# Patient Record
Sex: MALE | Race: WHITE | NOT HISPANIC OR LATINO | Employment: OTHER | URBAN - METROPOLITAN AREA
[De-identification: names, ages, dates, MRNs, and addresses within clinical notes are randomized per-mention and may not be internally consistent; named-entity substitution may affect disease eponyms.]

---

## 2017-05-10 PROBLEM — K76.89 OTHER CHRONIC NONALCOHOLIC LIVER DISEASE: Status: ACTIVE | Noted: 2017-05-10

## 2017-08-01 PROBLEM — K76.0 FATTY LIVER: Status: ACTIVE | Noted: 2017-08-01

## 2017-11-22 PROBLEM — I34.0 NON-RHEUMATIC MITRAL REGURGITATION: Status: ACTIVE | Noted: 2017-11-22

## 2017-11-22 PROBLEM — I25.10 CORONARY ARTERY DISEASE: Status: ACTIVE | Noted: 2017-11-22

## 2018-01-19 PROBLEM — K75.81 NASH (NONALCOHOLIC STEATOHEPATITIS): Status: ACTIVE | Noted: 2017-05-10

## 2018-08-16 PROBLEM — Z13.6 SCREENING FOR AAA (ABDOMINAL AORTIC ANEURYSM): Status: ACTIVE | Noted: 2018-08-16

## 2018-08-18 PROBLEM — M85.89 OSTEOPENIA OF MULTIPLE SITES: Chronic | Status: ACTIVE | Noted: 2018-08-18

## 2018-11-19 PROBLEM — Z13.6 SCREENING FOR AAA (ABDOMINAL AORTIC ANEURYSM): Status: RESOLVED | Noted: 2018-08-16 | Resolved: 2018-11-19

## 2019-02-21 PROBLEM — K44.9 HIATAL HERNIA: Status: ACTIVE | Noted: 2019-02-21

## 2019-02-21 PROBLEM — I65.29 OCCLUSION AND STENOSIS OF CAROTID ARTERY WITHOUT MENTION OF CEREBRAL INFARCTION: Status: ACTIVE | Noted: 2019-02-21

## 2019-02-21 PROBLEM — K43.9 VENTRAL HERNIA, UNSPECIFIED, WITHOUT MENTION OF OBSTRUCTION OR GANGRENE: Status: ACTIVE | Noted: 2019-02-21

## 2019-02-21 PROBLEM — M81.0 AGE-RELATED OSTEOPOROSIS WITHOUT CURRENT PATHOLOGICAL FRACTURE: Status: ACTIVE | Noted: 2019-02-21

## 2019-02-21 PROBLEM — M15.0 PRIMARY OSTEOARTHRITIS INVOLVING MULTIPLE JOINTS: Status: ACTIVE | Noted: 2019-02-21

## 2019-02-21 PROBLEM — M19.049 OSTEOARTHROSIS, UNSPECIFIED WHETHER GENERALIZED OR LOCALIZED, HAND: Status: ACTIVE | Noted: 2019-02-21

## 2019-02-21 PROBLEM — N40.0 HYPERTROPHY OF PROSTATE WITHOUT URINARY OBSTRUCTION AND OTHER LOWER URINARY TRACT SYMPTOMS (LUTS): Status: ACTIVE | Noted: 2019-02-21

## 2019-02-21 PROBLEM — M15.9 PRIMARY OSTEOARTHRITIS INVOLVING MULTIPLE JOINTS: Status: ACTIVE | Noted: 2019-02-21

## 2019-02-21 PROBLEM — I65.29 STENOSIS OF CAROTID ARTERY: Status: ACTIVE | Noted: 2019-02-21

## 2019-02-21 PROBLEM — J30.1 SEASONAL ALLERGIC RHINITIS DUE TO POLLEN: Status: ACTIVE | Noted: 2019-02-21

## 2019-02-21 PROBLEM — I36.1 NON-RHEUMATIC TRICUSPID VALVE INSUFFICIENCY: Status: ACTIVE | Noted: 2019-02-21

## 2019-02-21 PROBLEM — I27.9 CHRONIC PULMONARY HEART DISEASE: Status: ACTIVE | Noted: 2019-02-21

## 2019-02-21 PROBLEM — M62.00 DIASTASIS OF MUSCLE: Status: ACTIVE | Noted: 2019-02-21

## 2019-05-08 PROBLEM — J98.11 ATELECTASIS OF BOTH LUNGS: Status: ACTIVE | Noted: 2019-05-08

## 2019-05-08 PROBLEM — M48.10 DISH (DIFFUSE IDIOPATHIC SKELETAL HYPEROSTOSIS): Chronic | Status: ACTIVE | Noted: 2019-05-08

## 2019-05-08 PROBLEM — R91.8 LUNG NODULES: Status: ACTIVE | Noted: 2019-05-08

## 2019-05-08 PROBLEM — M48.10 DISH (DIFFUSE IDIOPATHIC SKELETAL HYPEROSTOSIS): Status: ACTIVE | Noted: 2019-05-08

## 2019-05-08 PROBLEM — I70.0 THORACIC AORTA ATHEROSCLEROSIS: Chronic | Status: ACTIVE | Noted: 2019-05-08

## 2019-12-02 PROBLEM — R06.89 ABNORMAL BREATH SOUNDS: Status: ACTIVE | Noted: 2019-12-02

## 2019-12-27 PROBLEM — R00.1 BRADYCARDIA: Status: ACTIVE | Noted: 2019-12-27

## 2019-12-27 PROBLEM — E86.1 HYPOTENSION DUE TO HYPOVOLEMIA: Status: ACTIVE | Noted: 2019-12-27

## 2019-12-27 PROBLEM — R06.2 WHEEZE: Status: ACTIVE | Noted: 2019-12-27

## 2019-12-27 PROBLEM — N17.9 AKI (ACUTE KIDNEY INJURY): Status: ACTIVE | Noted: 2019-12-27

## 2019-12-27 PROBLEM — E87.5 HYPERKALEMIA: Status: ACTIVE | Noted: 2019-12-27

## 2019-12-27 PROBLEM — R55 SYNCOPE: Status: ACTIVE | Noted: 2019-12-27

## 2019-12-27 PROBLEM — D69.6 THROMBOCYTOPENIA: Status: ACTIVE | Noted: 2019-12-27

## 2020-01-20 PROBLEM — R31.9 HEMATURIA: Status: ACTIVE | Noted: 2020-01-20

## 2020-06-22 PROBLEM — I10 ESSENTIAL HYPERTENSION: Chronic | Status: ACTIVE | Noted: 2020-06-22

## 2020-09-14 PROBLEM — E78.1 HYPERTRIGLYCERIDEMIA: Status: ACTIVE | Noted: 2020-09-14

## 2020-12-18 ENCOUNTER — OFFICE VISIT (OUTPATIENT)
Dept: URGENT CARE | Facility: CLINIC | Age: 75
End: 2020-12-18
Payer: MEDICARE

## 2020-12-18 VITALS
TEMPERATURE: 98 F | OXYGEN SATURATION: 95 % | HEART RATE: 68 BPM | DIASTOLIC BLOOD PRESSURE: 78 MMHG | HEIGHT: 67 IN | BODY MASS INDEX: 30.61 KG/M2 | SYSTOLIC BLOOD PRESSURE: 126 MMHG | WEIGHT: 195 LBS | RESPIRATION RATE: 16 BRPM

## 2020-12-18 DIAGNOSIS — R53.83 FATIGUE, UNSPECIFIED TYPE: Primary | ICD-10-CM

## 2020-12-18 LAB
CTP QC/QA: YES
SARS-COV-2 RDRP RESP QL NAA+PROBE: NEGATIVE

## 2020-12-18 PROCEDURE — 99214 OFFICE O/P EST MOD 30 MIN: CPT | Mod: S$GLB,CS,, | Performed by: FAMILY MEDICINE

## 2020-12-18 PROCEDURE — U0002: ICD-10-PCS | Mod: QW,S$GLB,, | Performed by: FAMILY MEDICINE

## 2020-12-18 PROCEDURE — 99214 PR OFFICE/OUTPT VISIT, EST, LEVL IV, 30-39 MIN: ICD-10-PCS | Mod: S$GLB,CS,, | Performed by: FAMILY MEDICINE

## 2020-12-18 PROCEDURE — U0002 COVID-19 LAB TEST NON-CDC: HCPCS | Mod: QW,S$GLB,, | Performed by: FAMILY MEDICINE

## 2020-12-18 NOTE — PROGRESS NOTES
"Subjective:       Patient ID: Rodney Irene Jr. is a 75 y.o. male.    Vitals:  height is 5' 7" (1.702 m) and weight is 88.5 kg (195 lb). His temperature is 97.8 °F (36.6 °C). His blood pressure is 126/78 and his pulse is 68. His respiration is 16 and oxygen saturation is 95%.     Chief Complaint: Sinus Problem    Patient c/o sinus issues, symptoms started 2-3 days ago. Symptoms: fatigue, shortness of breath, body aches, congestion, sinus headache.  Treatment tried: Zinc and Vitamin-C, no with relief    Sinus Problem  This is a new problem. The current episode started yesterday. The problem has been gradually worsening since onset. There has been no fever. Associated symptoms include congestion, coughing, headaches, shortness of breath and sinus pressure. Pertinent negatives include no chills, diaphoresis, ear pain or sore throat. Treatments tried: zinc and vitamin-c.       Constitution: Positive for appetite change, fatigue and generalized weakness. Negative for chills, sweating and fever.   HENT: Positive for congestion, sinus pain and sinus pressure. Negative for ear pain, sore throat and voice change.    Neck: Negative for painful lymph nodes.   Eyes: Negative for eye redness.   Respiratory: Positive for cough and shortness of breath. Negative for chest tightness, sputum production, bloody sputum, COPD, stridor, wheezing and asthma.    Gastrointestinal: Negative for nausea and vomiting.   Musculoskeletal: Negative for muscle ache.   Skin: Negative for rash.   Allergic/Immunologic: Negative for seasonal allergies and asthma.   Neurological: Positive for headaches.   Hematologic/Lymphatic: Negative for swollen lymph nodes.       Objective:      Physical Exam   Constitutional: Patient is oriented to person, place, and time. Patient appears well-developed. Patient is cooperative.  Non-toxic appearance. Patient does not appear ill. No distress.   HENT:   Head: Normocephalic and atraumatic.   Right Ear: Hearing " "and external ear normal.   Left Ear: Hearing and external ear normal.   Nose: Nose normal. No mucosal edema, rhinorrhea or nasal deformity. No epistaxis.   Mouth/Throat: Uvula is midline, oropharynx is clear and moist and mucous membranes are normal. No trismus in the jaw. Normal dentition. No uvula swelling. No posterior oropharyngeal erythema.   Eyes: Conjunctivae and lids are normal. Right eye exhibits no discharge. Left eye exhibits no discharge. No scleral icterus.   Neck: Trachea normal, normal range of motion, full passive range of motion without pain and phonation normal.   Cardiovascular: Normal rate and regular rhythm.   Pulmonary/Chest: Effort normal and breath sounds normal. No respiratory distress.   Abdominal: Normal appearance. She exhibits no distension. There is no abdominal tenderness (none reported).   Musculoskeletal: Normal range of motion.         General: No deformity.   Neurological: Patient is alert and oriented to person, place, and time. Patient exhibits normal muscle tone. Coordination normal.   Skin: Skin is warm, dry, intact, not diaphoretic and not pale.   Psychiatric: Patient's speech is normal and behavior is normal. Judgment and thought content normal.   Nursing note and vitals reviewed.       Assessment:       1. Fatigue, unspecified type        Plan:         Fatigue, unspecified type  -     POCT COVID-19 Rapid Screening      All applicable EKG, medical records, labs, imaging reviewed in Epic and discussed with patient.     Results for orders placed or performed in visit on 12/18/20   POCT COVID-19 Rapid Screening   Result Value Ref Range    POC Rapid COVID Negative Negative     Acceptable Yes        Patient Instructions   o You have tested negative for COVID-19 today.  If you did not have a close exposure (as defined below) you can return to your normal daily activities to include social distancing, wearing a mask and frequent handwashing.  o A "close exposure" is " defined as anyone who has had an exposure (masked or unmasked) to a known COVID -19 positive person within 6 ft for longer than 15 minutes. If your exposure meets this definition, you are required by CDC guidelines to quarantine for at least 7-10 days from time of exposure.  o The CDC states that a test can be performed for an asymptomatic patient (someone who does not have any symptoms) after a close exposure, and that a test should be done if you develop symptoms after a close exposure as described above.  o Specifically, you can test at day 5 or later if asymptomatic in order to get released from quarantine on day 7 or later.  If you develop symptoms sooner, you should test when your symptoms start.  o If you developed symptoms since the exposure, and your test was negative today and less than 5 days from your exposure, you still have to quarantine for 7-10 days from the date of the exposure.  o The 7-10 day quarantine begins from the day you were exposed, not the day of your test.  For example, if your exposure was on a Monday, and you waited until Friday of the same week to get tested and it was negative, your 7-10 day quarantine begins from that Monday, not the Friday you tested negative.  o Please note, if you decide to test as an asymptomatic during your quarantine and you are positive, you will be restarting your quarantine and moving from a possible 10 day quarantine (if you do not test), to a 11 day or greater quarantine.      ------    Thank you for enrolling in [facility]. Please follow the instructions below to securely access your online medical record. My allows you to send messages to your doctor, view your test results, renew your prescriptions, schedule appointments, and more.     How Do I Sign Up?  1. In your Internet browser, go to [email or web address].  2. In the lower right of the page, click the Activate Now link located under the Have Access Code? Title.  3. Enter your [facility] Access Code  exactly as it appears below. You will not need to use this code after youve completed the sign-up process. If you do not sign up before the expiration date, you must request a new code.  [facility] Access Code: Activation code not generated  Current Patient Portal Status: Patient Declined    4. Enter Date of Birth (mm/dd/yyyy) as indicated and click the Next button. You will be taken to the next sign-up page.  5. Create a [facility] ID. This will be your new [facility] login ID and cannot be changed, so think of one that is secure and easy to remember.  6. Create a [facility] password.  Your password must be at least 8 characters long and contain at least 1 letter and 1 number.  You can change your password at any time.  7. Enter your Password Reset Question and Answer, then click the Next button.   8. Enter your e-mail address. You will receive e-mail notification when new information is available in [facility].  9. Click Sign Up. You can now view your medical record.     Additional Information  If you have questions, you can email [email or web address] or call [phone]  to talk to our [facility] staff. Remember, [facility] is NOT to be used for urgent needs. For medical emergencies, dial 911.     If not allergic,take tylenol (acetominophen) for fever control, chills, or body aches every 4 hours. Do not exceed 4000 mg/ day.If not allergic, take Motrin (Ibuprofen) every 4 hours for fever, chills, pain or inflammation. Do not exceed 2400 mg/day. You can alternate taking tylenol and motrin.    If you were prescribed a narcotic medication, do not drive or operate heavy equipment or machinery while taking these medications.  You must understand that you've received an Urgent Care treatment only and that you may be released before all your medical problems are known or treated. You, the patient, will arrange for follow up care as instructed.  Follow up with your PCP or specialty clinic as directed in the next 1-2 weeks if  not improved or as needed.  You can call [phone] to schedule an appointment with the appropriate provider.  If your condition worsens we recommend that you receive another evaluation at the emergency room immediately or contact your primary medical clinics after hours call service to discuss your concerns.    Please return here or go to the Emergency Department for any concerns or worsening of condition.    If you have been referred to another provider and wish to call to check on the status of your referral, please call [facility] at [phone]

## 2020-12-18 NOTE — PATIENT INSTRUCTIONS
"o You have tested negative for COVID-19 today.  If you did not have a close exposure (as defined below) you can return to your normal daily activities to include social distancing, wearing a mask and frequent handwashing.  o A "close exposure" is defined as anyone who has had an exposure (masked or unmasked) to a known COVID -19 positive person within 6 ft for longer than 15 minutes. If your exposure meets this definition, you are required by CDC guidelines to quarantine for at least 7-10 days from time of exposure.  o The CDC states that a test can be performed for an asymptomatic patient (someone who does not have any symptoms) after a close exposure, and that a test should be done if you develop symptoms after a close exposure as described above.  o Specifically, you can test at day 5 or later if asymptomatic in order to get released from quarantine on day 7 or later.  If you develop symptoms sooner, you should test when your symptoms start.  o If you developed symptoms since the exposure, and your test was negative today and less than 5 days from your exposure, you still have to quarantine for 7-10 days from the date of the exposure.  o The 7-10 day quarantine begins from the day you were exposed, not the day of your test.  For example, if your exposure was on a Monday, and you waited until Friday of the same week to get tested and it was negative, your 7-10 day quarantine begins from that Monday, not the Friday you tested negative.  o Please note, if you decide to test as an asymptomatic during your quarantine and you are positive, you will be restarting your quarantine and moving from a possible 10 day quarantine (if you do not test), to a 11 day or greater quarantine.      ------    Thank you for enrolling in [facility]. Please follow the instructions below to securely access your online medical record. My allows you to send messages to your doctor, view your test results, renew your prescriptions, schedule " appointments, and more.     How Do I Sign Up?  1. In your Internet browser, go to [email or web address].  2. In the lower right of the page, click the Activate Now link located under the Have Access Code? Title.  3. Enter your [facility] Access Code exactly as it appears below. You will not need to use this code after youve completed the sign-up process. If you do not sign up before the expiration date, you must request a new code.  [facility] Access Code: Activation code not generated  Current Patient Portal Status: Patient Declined    4. Enter Date of Birth (mm/dd/yyyy) as indicated and click the Next button. You will be taken to the next sign-up page.  5. Create a [facility] ID. This will be your new [facility] login ID and cannot be changed, so think of one that is secure and easy to remember.  6. Create a [facility] password.  Your password must be at least 8 characters long and contain at least 1 letter and 1 number.  You can change your password at any time.  7. Enter your Password Reset Question and Answer, then click the Next button.   8. Enter your e-mail address. You will receive e-mail notification when new information is available in [facility].  9. Click Sign Up. You can now view your medical record.     Additional Information  If you have questions, you can email [email or web address] or call [phone]  to talk to our [facility] staff. Remember, [facility] is NOT to be used for urgent needs. For medical emergencies, dial 911.     If not allergic,take tylenol (acetominophen) for fever control, chills, or body aches every 4 hours. Do not exceed 4000 mg/ day.If not allergic, take Motrin (Ibuprofen) every 4 hours for fever, chills, pain or inflammation. Do not exceed 2400 mg/day. You can alternate taking tylenol and motrin.    If you were prescribed a narcotic medication, do not drive or operate heavy equipment or machinery while taking these medications.  You must understand that you've received an Urgent  Care treatment only and that you may be released before all your medical problems are known or treated. You, the patient, will arrange for follow up care as instructed.  Follow up with your PCP or specialty clinic as directed in the next 1-2 weeks if not improved or as needed.  You can call [phone] to schedule an appointment with the appropriate provider.  If your condition worsens we recommend that you receive another evaluation at the emergency room immediately or contact your primary medical clinics after hours call service to discuss your concerns.    Please return here or go to the Emergency Department for any concerns or worsening of condition.    If you have been referred to another provider and wish to call to check on the status of your referral, please call [facility] at [phone]

## 2021-01-10 ENCOUNTER — IMMUNIZATION (OUTPATIENT)
Dept: FAMILY MEDICINE | Facility: CLINIC | Age: 76
End: 2021-01-10
Payer: MEDICARE

## 2021-01-10 DIAGNOSIS — Z23 NEED FOR VACCINATION: ICD-10-CM

## 2021-01-10 PROCEDURE — 91300 COVID-19, MRNA, LNP-S, PF, 30 MCG/0.3 ML DOSE VACCINE: CPT | Mod: PBBFAC,PO

## 2021-01-14 ENCOUNTER — CLINICAL SUPPORT (OUTPATIENT)
Dept: URGENT CARE | Facility: CLINIC | Age: 76
End: 2021-01-14
Payer: MEDICARE

## 2021-01-14 VITALS — HEART RATE: 67 BPM | TEMPERATURE: 97 F | OXYGEN SATURATION: 97 %

## 2021-01-14 DIAGNOSIS — Z20.822 EXPOSURE TO COVID-19 VIRUS: Primary | ICD-10-CM

## 2021-01-14 LAB
CTP QC/QA: YES
SARS-COV-2 RDRP RESP QL NAA+PROBE: NEGATIVE

## 2021-01-14 PROCEDURE — U0002 COVID-19 LAB TEST NON-CDC: HCPCS | Mod: QW,CR,S$GLB, | Performed by: PHYSICIAN ASSISTANT

## 2021-01-14 PROCEDURE — U0002: ICD-10-PCS | Mod: QW,CR,S$GLB, | Performed by: PHYSICIAN ASSISTANT

## 2021-01-31 ENCOUNTER — IMMUNIZATION (OUTPATIENT)
Dept: FAMILY MEDICINE | Facility: CLINIC | Age: 76
End: 2021-01-31
Payer: MEDICARE

## 2021-01-31 DIAGNOSIS — Z23 NEED FOR VACCINATION: Primary | ICD-10-CM

## 2021-01-31 PROCEDURE — 0002A COVID-19, MRNA, LNP-S, PF, 30 MCG/0.3 ML DOSE VACCINE: CPT | Mod: PBBFAC,PO

## 2021-01-31 PROCEDURE — 91300 COVID-19, MRNA, LNP-S, PF, 30 MCG/0.3 ML DOSE VACCINE: CPT | Mod: PBBFAC,PO

## 2021-03-02 PROBLEM — Z79.01 CURRENT USE OF LONG TERM ANTICOAGULATION: Status: ACTIVE | Noted: 2021-03-02

## 2021-03-02 PROBLEM — Z79.899 ON STATIN THERAPY: Status: ACTIVE | Noted: 2021-03-02

## 2021-03-02 PROBLEM — D69.6 THROMBOCYTOPENIA: Status: RESOLVED | Noted: 2019-12-27 | Resolved: 2021-03-02

## 2021-04-26 PROBLEM — R07.9 CHEST PAIN WITH MODERATE RISK FOR CARDIAC ETIOLOGY: Status: ACTIVE | Noted: 2021-04-26

## 2021-04-26 PROBLEM — Z71.89 ACP (ADVANCE CARE PLANNING): Status: ACTIVE | Noted: 2021-04-26

## 2021-09-04 PROBLEM — K76.0 FATTY LIVER: Chronic | Status: ACTIVE | Noted: 2017-08-01

## 2021-09-04 PROBLEM — M15.9 PRIMARY OSTEOARTHRITIS INVOLVING MULTIPLE JOINTS: Chronic | Status: ACTIVE | Noted: 2019-02-21

## 2021-09-04 PROBLEM — M15.0 PRIMARY OSTEOARTHRITIS INVOLVING MULTIPLE JOINTS: Chronic | Status: ACTIVE | Noted: 2019-02-21

## 2021-09-04 PROBLEM — K43.9 VENTRAL HERNIA: Chronic | Status: ACTIVE | Noted: 2019-02-21

## 2021-09-04 PROBLEM — K44.9 HIATAL HERNIA: Chronic | Status: ACTIVE | Noted: 2019-02-21

## 2021-09-04 PROBLEM — I27.9 CHRONIC PULMONARY HEART DISEASE: Chronic | Status: ACTIVE | Noted: 2019-02-21

## 2021-09-04 PROBLEM — K75.81 NASH (NONALCOHOLIC STEATOHEPATITIS): Chronic | Status: ACTIVE | Noted: 2017-05-10

## 2021-09-04 PROBLEM — N40.0 BENIGN PROSTATIC HYPERPLASIA WITHOUT LOWER URINARY TRACT SYMPTOMS: Chronic | Status: ACTIVE | Noted: 2019-02-21

## 2021-09-04 PROBLEM — I34.0 NON-RHEUMATIC MITRAL REGURGITATION: Chronic | Status: ACTIVE | Noted: 2017-11-22

## 2021-09-04 PROBLEM — I36.1 NON-RHEUMATIC TRICUSPID VALVE INSUFFICIENCY: Chronic | Status: ACTIVE | Noted: 2019-02-21

## 2021-09-04 PROBLEM — M81.0 AGE-RELATED OSTEOPOROSIS WITHOUT CURRENT PATHOLOGICAL FRACTURE: Chronic | Status: ACTIVE | Noted: 2019-02-21

## 2021-09-16 ENCOUNTER — OUTPATIENT CASE MANAGEMENT (OUTPATIENT)
Dept: ADMINISTRATIVE | Facility: OTHER | Age: 76
End: 2021-09-16

## 2021-09-27 ENCOUNTER — OUTPATIENT CASE MANAGEMENT (OUTPATIENT)
Dept: ADMINISTRATIVE | Facility: OTHER | Age: 76
End: 2021-09-27

## 2021-09-29 ENCOUNTER — TELEPHONE (OUTPATIENT)
Dept: PHARMACY | Facility: AMBULARY SURGERY CENTER | Age: 76
End: 2021-09-29

## 2021-10-26 ENCOUNTER — OUTPATIENT CASE MANAGEMENT (OUTPATIENT)
Dept: ADMINISTRATIVE | Facility: OTHER | Age: 76
End: 2021-10-26
Payer: MEDICARE

## 2021-10-27 ENCOUNTER — TELEPHONE (OUTPATIENT)
Dept: PHARMACY | Facility: AMBULARY SURGERY CENTER | Age: 76
End: 2021-10-27
Payer: MEDICARE

## 2021-10-28 ENCOUNTER — IMMUNIZATION (OUTPATIENT)
Dept: FAMILY MEDICINE | Facility: CLINIC | Age: 76
End: 2021-10-28
Payer: MEDICARE

## 2021-10-28 DIAGNOSIS — Z23 NEED FOR VACCINATION: Primary | ICD-10-CM

## 2021-10-28 PROCEDURE — 0003A COVID-19, MRNA, LNP-S, PF, 30 MCG/0.3 ML DOSE VACCINE: CPT | Mod: PBBFAC,PO

## 2021-10-28 PROCEDURE — 91300 COVID-19, MRNA, LNP-S, PF, 30 MCG/0.3 ML DOSE VACCINE: CPT | Mod: PBBFAC,PO

## 2021-11-23 ENCOUNTER — OUTPATIENT CASE MANAGEMENT (OUTPATIENT)
Dept: ADMINISTRATIVE | Facility: OTHER | Age: 76
End: 2021-11-23
Payer: MEDICARE

## 2022-09-27 PROBLEM — Z79.899 ON STATIN THERAPY: Chronic | Status: ACTIVE | Noted: 2021-03-02

## 2022-09-27 PROBLEM — E86.1 HYPOTENSION DUE TO HYPOVOLEMIA: Status: RESOLVED | Noted: 2019-12-27 | Resolved: 2022-09-27

## 2022-09-27 PROBLEM — I25.10 CORONARY ARTERY DISEASE: Chronic | Status: ACTIVE | Noted: 2017-11-22

## 2022-09-27 PROBLEM — R06.89 ABNORMAL BREATH SOUNDS: Status: RESOLVED | Noted: 2019-12-02 | Resolved: 2022-09-27

## 2022-09-27 PROBLEM — R07.9 CHEST PAIN WITH MODERATE RISK FOR CARDIAC ETIOLOGY: Status: RESOLVED | Noted: 2021-04-26 | Resolved: 2022-09-27

## 2022-12-02 PROBLEM — R91.8 LUNG NODULES: Chronic | Status: ACTIVE | Noted: 2019-05-08

## 2022-12-02 PROBLEM — I65.29 STENOSIS OF CAROTID ARTERY: Chronic | Status: ACTIVE | Noted: 2019-02-21

## 2022-12-02 PROBLEM — R31.9 HEMATURIA: Chronic | Status: ACTIVE | Noted: 2020-01-20

## 2022-12-02 PROBLEM — I65.29 CAROTID ARTERY STENOSIS: Chronic | Status: ACTIVE | Noted: 2019-02-21

## 2022-12-02 PROBLEM — E78.1 HYPERTRIGLYCERIDEMIA: Chronic | Status: ACTIVE | Noted: 2020-09-14

## 2023-05-03 PROBLEM — N17.9 AKI (ACUTE KIDNEY INJURY): Status: RESOLVED | Noted: 2019-12-27 | Resolved: 2023-05-03

## 2024-01-12 PROBLEM — I27.9 CHRONIC PULMONARY HEART DISEASE: Chronic | Status: RESOLVED | Noted: 2019-02-21 | Resolved: 2024-01-12

## 2024-01-12 PROBLEM — I25.118 ATHEROSCLEROTIC HEART DISEASE OF NATIVE CORONARY ARTERY WITH OTHER FORMS OF ANGINA PECTORIS: Status: ACTIVE | Noted: 2017-11-22

## 2024-03-04 PROBLEM — I50.9 CHRONIC CONGESTIVE HEART FAILURE: Status: ACTIVE | Noted: 2024-03-04

## 2024-04-03 PROBLEM — Z95.0 PACEMAKER: Status: ACTIVE | Noted: 2024-04-03

## 2024-04-30 ENCOUNTER — OFFICE VISIT (OUTPATIENT)
Dept: NEPHROLOGY | Facility: CLINIC | Age: 79
End: 2024-04-30
Payer: MEDICARE

## 2024-04-30 VITALS
BODY MASS INDEX: 27.15 KG/M2 | OXYGEN SATURATION: 94 % | SYSTOLIC BLOOD PRESSURE: 98 MMHG | HEART RATE: 70 BPM | DIASTOLIC BLOOD PRESSURE: 62 MMHG | HEIGHT: 68 IN

## 2024-04-30 DIAGNOSIS — N18.31 STAGE 3A CHRONIC KIDNEY DISEASE: ICD-10-CM

## 2024-04-30 DIAGNOSIS — I48.0 PAROXYSMAL ATRIAL FIBRILLATION: Chronic | ICD-10-CM

## 2024-04-30 DIAGNOSIS — M81.0 AGE-RELATED OSTEOPOROSIS WITHOUT CURRENT PATHOLOGICAL FRACTURE: Chronic | ICD-10-CM

## 2024-04-30 DIAGNOSIS — E21.3 HYPERPARATHYROIDISM: Chronic | ICD-10-CM

## 2024-04-30 DIAGNOSIS — N40.0 BENIGN PROSTATIC HYPERPLASIA WITHOUT LOWER URINARY TRACT SYMPTOMS: Chronic | ICD-10-CM

## 2024-04-30 DIAGNOSIS — I50.9 CHRONIC CONGESTIVE HEART FAILURE, UNSPECIFIED HEART FAILURE TYPE: ICD-10-CM

## 2024-04-30 DIAGNOSIS — I10 ESSENTIAL HYPERTENSION: Chronic | ICD-10-CM

## 2024-04-30 DIAGNOSIS — N18.2 STAGE 2 CHRONIC KIDNEY DISEASE: Primary | ICD-10-CM

## 2024-04-30 PROCEDURE — 3074F SYST BP LT 130 MM HG: CPT | Mod: CPTII,S$GLB,, | Performed by: STUDENT IN AN ORGANIZED HEALTH CARE EDUCATION/TRAINING PROGRAM

## 2024-04-30 PROCEDURE — 99204 OFFICE O/P NEW MOD 45 MIN: CPT | Mod: S$GLB,,, | Performed by: STUDENT IN AN ORGANIZED HEALTH CARE EDUCATION/TRAINING PROGRAM

## 2024-04-30 PROCEDURE — 3288F FALL RISK ASSESSMENT DOCD: CPT | Mod: CPTII,S$GLB,, | Performed by: STUDENT IN AN ORGANIZED HEALTH CARE EDUCATION/TRAINING PROGRAM

## 2024-04-30 PROCEDURE — 1101F PT FALLS ASSESS-DOCD LE1/YR: CPT | Mod: CPTII,S$GLB,, | Performed by: STUDENT IN AN ORGANIZED HEALTH CARE EDUCATION/TRAINING PROGRAM

## 2024-04-30 PROCEDURE — 3078F DIAST BP <80 MM HG: CPT | Mod: CPTII,S$GLB,, | Performed by: STUDENT IN AN ORGANIZED HEALTH CARE EDUCATION/TRAINING PROGRAM

## 2024-04-30 PROCEDURE — 99999 PR PBB SHADOW E&M-EST. PATIENT-LVL IV: CPT | Mod: PBBFAC,,, | Performed by: STUDENT IN AN ORGANIZED HEALTH CARE EDUCATION/TRAINING PROGRAM

## 2024-04-30 PROCEDURE — 1159F MED LIST DOCD IN RCRD: CPT | Mod: CPTII,S$GLB,, | Performed by: STUDENT IN AN ORGANIZED HEALTH CARE EDUCATION/TRAINING PROGRAM

## 2024-04-30 RX ORDER — LEVOTHYROXINE SODIUM 25 UG/1
25 TABLET ORAL
COMMUNITY
Start: 2024-04-22

## 2024-04-30 NOTE — PATIENT INSTRUCTIONS
I put together a guide for patients which includes some healthy tips from the [employer]:   Avoiding fluctuations in blood pressure (high and low spikes)  Maintain good glucose control if you have diabetes  Reduce/avoid extra salt intake  Avoid over the counter pain medications (NSAIDs)  Aerobic exercise at least 3x weekly as tolerated  Eat more whole foods (vegetables, fruit, fish, chicken, pork, beef, dairy) and less processed food  Control weight  Avoid smoking  Stay hydrated, avoid dehydration.  Annual flu shot and routine preventative cancer screening via your primary care doctor.     -Gianni Valencia MD

## 2024-04-30 NOTE — PROGRESS NOTES
Subjective:       Patient ID: Rodney Irene Jr. is a 78 y.o. White male who presents for new patient evaluation for chronic renal failure.  He was previously followed by Dr. Aguila locally for chronic kidney disease evaluation and management.  He has a pertinent past medical history including paroxysmal atrial fibrillation s/p pacemaker, coronary artery disease, hyperparathyroidism of renal origin, BPH, hypertension, and osteopenia on alendronate therapy.  He is previously reported history of chronic kidney disease stage 3a.  His last serum creatinine was on 03/29/2024 and was 1.26 mg/dL, slightly up from baseline of 0.9-1.1.    In terms of his renal history, he denies hospitalizations for prior IZABEL or IZABEL requiring RRT. Reports a remote history of single nephrolith episode. No reported history of frequent or recurrent use of NSAIDs/COXI. No pertinent rheumatologic or AI disease history. Denies any pertinent family history of known kidney disease, or family members diagnosed with ESRD requiring dialysis.  Smoking Hx: quit tobacco in the 80s  Other pertinent urologic history: BPH - s/p TURP  Fluid intake: 2-3 bottles of water per day.    He has no uremic or urinary symptoms and is in his usual state of health.      He is interested in pursing the watchman procedure and has f/u for this already.     During this visit, the patient and I reviewed his lab trends, discussed CKD epidemiology and risk factors, as well as general lifestyle and risk factor modifications to reduce his risk of progression to ESRD.         Review of Systems   Constitutional:  Negative for chills, diaphoresis and fever.   Respiratory:  Negative for cough and shortness of breath.    Cardiovascular:  Negative for chest pain and leg swelling.   Gastrointestinal:  Negative for abdominal pain, diarrhea, nausea and vomiting.   Genitourinary:  Negative for difficulty urinating, dysuria and hematuria.   Musculoskeletal:  Negative for myalgias.    Neurological:  Negative for headaches.   Hematological:  Does not bruise/bleed easily.   All other systems reviewed and are negative.      The past medical, family and social histories were reviewed for this encounter.     Past Medical History:   Diagnosis Date    A-fib     Acute Bronchitis 9/14/16     Anticoagulant long-term use     Anxiety     Atrial fibrillation     Coronary artery disease     minimal    Cramp of limb     Diastasis of muscle     Diseases of tricuspid valve     Disorder of kidney and ureter     Elevated prostate specific antigen (PSA)     GERD (gastroesophageal reflux disease)     Hypercalcemia     Hyperparathyroidism     Hyperpotassemia     Hypertension     Hypertrophy of prostate without urinary obstruction and other lower urinary tract symptoms (LUTS)     Irritable bowel syndrome     Mitral valve disorders(424.0)     Non-cardiac chest pain     Occlusion and stenosis of carotid artery without mention of cerebral infarction     Osteoarthrosis, unspecified whether generalized or localized, hand     Osteoporosis     Osteoporosis, unspecified     Other bursitis disorders     Other chronic nonalcoholic liver disease     Other chronic pulmonary heart diseases     Trouble in sleeping     Unspecified sleep apnea     don't use C-PAP    Ventral hernia, unspecified, without mention of obstruction or gangrene      Past Surgical History:   Procedure Laterality Date    A-V CARDIAC PACEMAKER INSERTION  4/4/2024    Procedure: Dual Chamber PPM (Leon);  Surgeon: Todd Serra III, MD;  Location: [facility] CATH;  Service: Cardiology;;    COLONOSCOPY  08/16/2012    Dr. Espinosa    colonoscopy 2021      left knee      PROSTATE BIOPSY  06/16/2018    approx date    PROSTATE BIOPSY  01/2019    Lupillo Guevara MD    right shoulder       Social History     Socioeconomic History    Marital status:      Spouse name: Tasneem    Number of children: 3   Tobacco Use    Smoking status: Former     Current packs/day: 0.00      Average packs/day: 0.5 packs/day for 10.0 years (5.0 ttl pk-yrs)     Types: Cigarettes     Start date: 1970     Quit date: 1980     Years since quittin.3    Smokeless tobacco: Never   Substance and Sexual Activity    Alcohol use: Not Currently     Alcohol/week: 1.0 standard drink of alcohol     Types: 1 Cans of beer per week    Drug use: Never    Sexual activity: Yes     Partners: Female     Birth control/protection: None     Social Determinants of Health     Financial Resource Strain: Low Risk  (4/3/2024)    Overall Financial Resource Strain (CARDIA)     Difficulty of Paying Living Expenses: Not hard at all   Food Insecurity: No Food Insecurity (4/3/2024)    Hunger Vital Sign     Worried About Running Out of Food in the Last Year: Never true     Ran Out of Food in the Last Year: Never true   Transportation Needs: No Transportation Needs (4/3/2024)    PRAPARE - Transportation     Lack of Transportation (Medical): No     Lack of Transportation (Non-Medical): No   Physical Activity: Inactive (4/3/2024)    Exercise Vital Sign     Days of Exercise per Week: 0 days     Minutes of Exercise per Session: 0 min   Stress: No Stress Concern Present (4/3/2024)    Citizen of [place] [employer] - Occupational Stress Questionnaire     Feeling of Stress : Only a little   Housing Stability: Low Risk  (4/3/2024)    Housing Stability Vital Sign     Unable to Pay for Housing in the Last Year: No     Number of Places Lived in the Last Year: 1     Unstable Housing in the Last Year: No     Current Outpatient Medications   Medication Sig Dispense Refill    ascorbic acid, vitamin C, (VITAMIN C) 1000 MG tablet Take 1,000 mg by mouth once daily.      carvediloL (COREG) 6.25 MG tablet Take 12.5 mg by mouth 2 (two) times daily.      citalopram (CELEXA) 10 MG tablet Take 10 mg by mouth once daily.      dutasteride (AVODART) 0.5 mg capsule Take 0.5 mg by mouth every Mon, Wed, Fri.   3    ENTRESTO 49-51 mg per tablet Take 1 tablet  "by mouth 2 (two) times daily.      levothyroxine (SYNTHROID) 25 MCG tablet Take 25 mcg by mouth.      magnesium hydroxide (MAGNESIA ORAL) Take 400 mg by mouth nightly.      multivitamin (THERAGRAN) per tablet Take 1 tablet by mouth 2 (two) times a day.      omeprazole (PRILOSEC) 20 MG capsule Take 20 mg by mouth every Mon, Wed, Fri.       rivaroxaban (XARELTO) 20 mg Tab Take 20 mg by mouth every evening.       rosuvastatin (CRESTOR) 20 MG tablet TAKE 1 TABLET BY MOUTH DAILY FOR CHOLESTEROL (Patient taking differently: Take 20 mg by mouth every evening.) 90 tablet 1    albuterol-ipratropium (DUO-NEB) 2.5 mg-0.5 mg/3 mL nebulizer solution Take 3 mLs by nebulization every 6 (six) hours while awake. (Patient not taking: Reported on 4/30/2024) 270 mL 3    alendronate (FOSAMAX) 10 MG Tab TAKE 1 TABLET BY MOUTH EVERY MONDAY - TAKE WITH A FULL GLASS OF WATER AND DO NOT LIE FLAT FOR 30 MINUTES AFTER TAKING (Patient not taking: Reported on 4/30/2024) 12 tablet 1    cinacalcet (SENSIPAR) 30 MG Tab Take 30 mg by mouth every Mon, Wed, Fri.  (Patient not taking: Reported on 4/30/2024)      hydrALAZINE (APRESOLINE) 25 MG tablet Take 25 mg by mouth daily as needed. (Patient not taking: Reported on 4/30/2024)      levocetirizine (XYZAL) 5 MG tablet Take 1 tablet (5 mg total) by mouth every evening. (Patient not taking: Reported on 4/30/2024) 30 tablet 0    traZODone (DESYREL) 50 MG tablet Take 50 mg by mouth every evening. (Patient not taking: Reported on 4/30/2024)      vitamin D (VITAMIN D3) 1000 units Tab Take 5,000 Units by mouth once daily. (Patient not taking: Reported on 4/30/2024)      zinc gluconate 50 mg tablet Take 50 mg by mouth once daily. (Patient not taking: Reported on 4/30/2024)       No current facility-administered medications for this visit.     Ht 5' 8" (1.727 m)   BMI 27.15 kg/m²     Objective:      Physical Exam  Vitals reviewed.   Constitutional:       General: He is not in acute distress.     Appearance: " Normal appearance.   HENT:      Head: Normocephalic and atraumatic.      Right Ear: External ear normal.      Left Ear: External ear normal.      Nose: Nose normal. No congestion.      Mouth/Throat:      Mouth: Mucous membranes are moist.      Pharynx: Oropharynx is clear. No oropharyngeal exudate or posterior oropharyngeal erythema.   Eyes:      General: No scleral icterus.     Extraocular Movements: Extraocular movements intact.   Cardiovascular:      Rate and Rhythm: Normal rate and regular rhythm.      Pulses: Normal pulses.      Heart sounds: Normal heart sounds.      No friction rub.   Pulmonary:      Effort: Pulmonary effort is normal. No respiratory distress.      Breath sounds: Normal breath sounds.   Abdominal:      General: Abdomen is protuberant.      Palpations: Abdomen is soft.   Musculoskeletal:         General: No swelling.      Cervical back: Normal range of motion. No tenderness.      Right lower leg: No edema.      Left lower leg: No edema.   Neurological:      General: No focal deficit present.      Mental Status: He is oriented to person, place, and time.      Motor: No weakness.   Psychiatric:         Mood and Affect: Mood normal.         Behavior: Behavior normal.         Assessment:     Lab Results   Component Value Date    CREATININE 1.26 03/29/2024    BUN 27 (H) 03/29/2024     03/29/2024    K 4.7 03/29/2024     03/29/2024    CO2 26 03/29/2024     Lab Results   Component Value Date    .0 (H) 07/06/2023    CALCIUM 10.2 03/29/2024    PHOS 3.0 07/06/2023     Lab Results   Component Value Date    HCT 44.5 03/29/2024     Prot/Creat Ratio, Urine   Date Value Ref Range Status   07/06/2023 134.2 (H) 15.0 - 68.0 mg/g Final   07/05/2022 83.4 (H) 15.0 - 68.0 mg/g Final   12/29/2019 349.5 (H) 15.0 - 68.0 mg/g Final       Lab Results   Component Value Date    MICALBCREAT 39.1 (H) 07/06/2023    MICALBCREAT 20.6 07/05/2022    MICALBCREAT 109.2 (H) 12/29/2019           1. Stage 3a chronic  kidney disease    2. Paroxysmal atrial fibrillation    3. Age-related osteoporosis without current pathological fracture    4. Benign prostatic hyperplasia without lower urinary tract symptoms    5. Essential hypertension    6. Chronic congestive heart failure, unspecified heart failure type    7. Hyperparathyroidism        Plan:   Return to clinic in 4 months.  Labs for next visit include PTH, RFP, UACR, UPCR, Vit D, .  Baseline creatinine is 0.9-1.1.    CKD stage G2 A2  Risk: hypertension, CV/hemodynamics, age  -on RASI for CKD-MACE risk reduction, proteinuria reduction and casa-protection  -avoiding dehydration or decompensated CHF with maintenance of consistent fluid intake  -avoiding extra salt in the diet    Paroxysmal atrial fibrillation s/p pacer. On Xarelto. Going for watchman evaluation soon.    BPH s/p TURP - denies symptoms today    Hypertension - holding prn hydralazine at this time. On low dose entresto. BP reviewed; asymptomatic at this time.    CHF - appears compensated at this time    Hyperparathyroidism / osteoporosis - currently on alendronate therapy by your PCP. Difficult to interpret PTH and Ca values while on this therapy. Will hold cinacalcet for now and monitor trends.     Joe Valencia MD  [facility]    KFRE 2-Year: 0.1% at 3/29/2024 12:32 PM  Calculated from:  Serum Creatinine: 1.26 mg/dL at 3/29/2024 12:32 PM  Urine Albumin Creatinine Ratio: 39.1 ug/mg at 7/6/2023 10:21 AM  Age: 78 years  Sex: Male at 3/29/2024 12:32 PM  Has CKD-3 to CKD-5: No    KFRE 5-Year: 0.3% at 3/29/2024 12:32 PM  Calculated from:  Serum Creatinine: 1.26 mg/dL at 3/29/2024 12:32 PM  Urine Albumin Creatinine Ratio: 39.1 ug/mg at 7/6/2023 10:21 AM  Age: 78 years  Sex: Male at 3/29/2024 12:32 PM  Has CKD-3 to CKD-5: No

## 2024-05-06 ENCOUNTER — TELEPHONE (OUTPATIENT)
Dept: CARDIOLOGY | Facility: CLINIC | Age: 79
End: 2024-05-06
Payer: MEDICARE

## 2024-05-21 ENCOUNTER — TELEPHONE (OUTPATIENT)
Dept: CARDIOLOGY | Facility: CLINIC | Age: 79
End: 2024-05-21
Payer: MEDICARE

## 2024-05-24 ENCOUNTER — OFFICE VISIT (OUTPATIENT)
Dept: CARDIOLOGY | Facility: CLINIC | Age: 79
End: 2024-05-24
Payer: MEDICARE

## 2024-05-24 VITALS
SYSTOLIC BLOOD PRESSURE: 112 MMHG | WEIGHT: 186.31 LBS | BODY MASS INDEX: 28.24 KG/M2 | DIASTOLIC BLOOD PRESSURE: 71 MMHG | HEIGHT: 68 IN | HEART RATE: 70 BPM

## 2024-05-24 DIAGNOSIS — Z95.0 STATUS POST PLACEMENT OF CARDIAC PACEMAKER: ICD-10-CM

## 2024-05-24 DIAGNOSIS — I49.5 SINUS NODE DYSFUNCTION: ICD-10-CM

## 2024-05-24 DIAGNOSIS — I10 PRIMARY HYPERTENSION: ICD-10-CM

## 2024-05-24 DIAGNOSIS — I48.21 PERMANENT ATRIAL FIBRILLATION: Primary | ICD-10-CM

## 2024-05-24 DIAGNOSIS — Z91.89 AT HIGH RISK FOR BLEEDING: ICD-10-CM

## 2024-05-24 PROCEDURE — 1159F MED LIST DOCD IN RCRD: CPT | Mod: CPTII,S$GLB,, | Performed by: INTERNAL MEDICINE

## 2024-05-24 PROCEDURE — 3078F DIAST BP <80 MM HG: CPT | Mod: CPTII,S$GLB,, | Performed by: INTERNAL MEDICINE

## 2024-05-24 PROCEDURE — 99999 PR PBB SHADOW E&M-EST. PATIENT-LVL III: CPT | Mod: PBBFAC,,, | Performed by: INTERNAL MEDICINE

## 2024-05-24 PROCEDURE — 99205 OFFICE O/P NEW HI 60 MIN: CPT | Mod: S$GLB,,, | Performed by: INTERNAL MEDICINE

## 2024-05-24 PROCEDURE — 3288F FALL RISK ASSESSMENT DOCD: CPT | Mod: CPTII,S$GLB,, | Performed by: INTERNAL MEDICINE

## 2024-05-24 PROCEDURE — 3074F SYST BP LT 130 MM HG: CPT | Mod: CPTII,S$GLB,, | Performed by: INTERNAL MEDICINE

## 2024-05-24 PROCEDURE — 1126F AMNT PAIN NOTED NONE PRSNT: CPT | Mod: CPTII,S$GLB,, | Performed by: INTERNAL MEDICINE

## 2024-05-24 PROCEDURE — 1101F PT FALLS ASSESS-DOCD LE1/YR: CPT | Mod: CPTII,S$GLB,, | Performed by: INTERNAL MEDICINE

## 2024-05-24 NOTE — PROGRESS NOTES
Structural Cardiology Clinic Note  [facility]  Date: 5/24/24    Patient: Rodney Irene Jr., 1945, [id number]  Primary Care Provider: JOSELUIS Gómez Jr., MD     Chief Complaint/Reason for Referral: RYAN     Subjective:       Rodney Irene Jr. is a 78 y.o. male who presents for consult. They are not accompanied. They were referred by Dr. Love.    Not sure about last TTE. Has been on rivaroxaban for years. Every now and then has gross hematuria. Cystoscopies have not found a cause for bleeding. No melena, hematochezia or hematemesis. No falls. No syncope. No chest discomfort. AF is asymptomatic. No edema. No orthopnea.     Focused Past History includes:  Permanent AF  CAD, no history of coronary revascularization    Carotid stenosis with no revascularization   CHF   Aortic atherosclerosis   Valvular heart disease   CKD 3A  Hepatic steatosis   Hematuria   Permanent pacemaker 4/4/24  Hypertension  Reports history of stroke in his 60s, unconfirmed   Former smoker in the 1980s  No personal history of cancer     Outside records from Dr. Love's office reviewed:   Carotid ultrasound June 2023 reported no significant stenosis   Stress test October 2022 reported no ischemia   TTE May 2024 reported normal EF, mild AR, mild-to-moderate MR    Current Outpatient Medications   Medication Sig    ascorbic acid, vitamin C, (VITAMIN C) 1000 MG tablet Take 1,000 mg by mouth once daily.    carvediloL (COREG) 6.25 MG tablet Take 12.5 mg by mouth 2 (two) times daily.    citalopram (CELEXA) 10 MG tablet Take 10 mg by mouth once daily.    dutasteride (AVODART) 0.5 mg capsule Take 0.5 mg by mouth every Mon, Wed, Fri.     ENTRESTO 49-51 mg per tablet Take 1 tablet by mouth 2 (two) times daily.    hydrALAZINE (APRESOLINE) 25 MG tablet Take 25 mg by mouth daily as needed.    levocetirizine (XYZAL) 5 MG tablet Take 1 tablet (5 mg total) by mouth every evening.    levothyroxine (SYNTHROID) 25 MCG tablet Take 25 mcg  by mouth.    magnesium hydroxide (MAGNESIA ORAL) Take 400 mg by mouth nightly.    multivitamin (THERAGRAN) per tablet Take 1 tablet by mouth 2 (two) times a day.    omeprazole (PRILOSEC) 20 MG capsule Take 20 mg by mouth every Mon, Wed, Fri.     rivaroxaban (XARELTO) 20 mg Tab Take 15 mg by mouth every evening.    rosuvastatin (CRESTOR) 20 MG tablet TAKE 1 TABLET BY MOUTH DAILY FOR CHOLESTEROL (Patient taking differently: Take 20 mg by mouth every evening.)     No current facility-administered medications for this visit.            Objective       Review of Systems  Constitutional: negative for fevers, night sweats, and weight loss  Eyes: negative for visual disturbance, diplopia  Respiratory: negative for cough, hemoptysis, sputum, and wheezing  Cardiovascular: see HPI  Gastrointestinal: negative for abdominal pain, bright red blood per rectum, change in bowel habits, dysphagia, melena, and reflux symptoms  Genitourinary:negative for dysuria, frequency, and hematuria  Hematologic/lymphatic: negative for bleeding, easy bruising, and lymphadenopathy  Musculoskeletal:negative for arthralgias, back pain, and myalgias  Neurological: negative for gait problems, paresthesia, speech problems, vertigo, and weakness  Behavioral/Psych: negative for excessive alcohol consumption, illegal drug usage, and sleep disturbance    -------------------------------------    A-fib    Acute Bronchitis 9/14/16    Anticoagulant long-term use    Anxiety    Atrial fibrillation    Coronary artery disease    minimal    Cramp of limb    Diastasis of muscle    Diseases of tricuspid valve    Disorder of kidney and ureter    Elevated prostate specific antigen (PSA)    GERD (gastroesophageal reflux disease)    Hypercalcemia    Hyperparathyroidism    Hyperpotassemia    Hypertension    Hypertrophy of prostate without urinary obstruction and other lower urinary tract symptoms (LUTS)    Irritable bowel syndrome    Mitral valve disorders(424.0)     "Non-cardiac chest pain    Occlusion and stenosis of carotid artery without mention of cerebral infarction    Osteoarthrosis, unspecified whether generalized or localized, hand    Osteoporosis    Osteoporosis, unspecified    Other bursitis disorders    Other chronic nonalcoholic liver disease    Other chronic pulmonary heart diseases    Trouble in sleeping    Unspecified sleep apnea    don't use C-PAP    Ventral hernia, unspecified, without mention of obstruction or gangrene     ----------------------------    A-v cardiac pacemaker insertion    Procedure: Dual Chamber PPM (Leon);  Surgeon: Todd Serra III, MD;  Location: [facility] CATH;  Service: Cardiology;;    Colonoscopy    Dr. Espinosa    Colonoscopy     Left knee    Prostate biopsy    approx date    Prostate biopsy    Lupillo Guevara MD    Right shoulder        Family History   Problem Relation Name Age of Onset    Lung cancer Mother      Cancer Mother       Social History     Tobacco Use    Smoking status: Former     Current packs/day: 0.00     Average packs/day: 0.5 packs/day for 10.0 years (5.0 ttl pk-yrs)     Types: Cigarettes     Start date:      Quit date:      Years since quittin.4    Smokeless tobacco: Never   Substance Use Topics    Alcohol use: Not Currently     Alcohol/week: 1.0 standard drink of alcohol     Types: 1 Cans of beer per week    Drug use: Never       Physical Exam  /71 (BP Location: Left arm, Patient Position: Sitting, BP Method: Large (Automatic))   Pulse 70   Ht 5' 8" (1.727 m)   Wt 84.5 kg (186 lb 4.6 oz)   BMI 28.33 kg/m²   Body surface area is 2.01 meters squared.  Body mass index is 28.33 kg/m².    General appearance: alert, appears stated age, cooperative, and no distress  Head: Normocephalic, without obvious abnormality, atraumatic  Neck: no carotid bruit, no JVD, and supple, symmetrical, trachea midline  Lungs: clear to auscultation bilaterally  Heart: regular rate and rhythm; S1, S2 normal, no murmur, " click, rub or gallop  Abdomen: soft, non-tender, no distended  Extremities: extremities atraumatic, no pitting edema  Skin: warm, no cyanosis, no pathologic ecchymosis in exposed portions  Neurologic: Grossly normal. A&O x3      Lab Review   Lab Results   Component Value Date    WBC 5.37 03/29/2024    HGB 14.4 03/29/2024    HCT 44.5 03/29/2024    MCV 92 03/29/2024     03/29/2024         BMP  Lab Results   Component Value Date     03/29/2024    K 4.7 03/29/2024     03/29/2024    CO2 26 03/29/2024    BUN 27 (H) 03/29/2024    CREATININE 1.26 03/29/2024    CALCIUM 10.2 03/29/2024    ANIONGAP 6 03/29/2024    ESTGFRAFRICA >60 07/05/2022    EGFRNONAA >60 07/05/2022       Lab Results   Component Value Date    LABPROT 12.1 09/20/2004    ALBUMIN 4.5 03/04/2024       Lab Results   Component Value Date    ALT 27 03/04/2024    AST 35 03/04/2024    ALKPHOS 69 03/04/2024    BILITOT 0.7 03/04/2024       Lab Results   Component Value Date    TSH 2.840 03/04/2024       Lab Results   Component Value Date    CHOL 145 03/04/2024    CHOL 139 10/05/2023    CHOL 135 06/19/2023     Lab Results   Component Value Date    HDL 39 (L) 03/04/2024    HDL 48 10/05/2023    HDL 50 06/19/2023     Lab Results   Component Value Date    LDLCALC 70.8 03/04/2024    LDLCALC 57.2 (L) 10/05/2023    LDLCALC 65.2 06/19/2023     Lab Results   Component Value Date    TRIG 176 (H) 03/04/2024    TRIG 169 (H) 10/05/2023    TRIG 99 06/19/2023     Lab Results   Component Value Date    CHOLHDL 26.9 03/04/2024    CHOLHDL 34.5 10/05/2023    CHOLHDL 37.0 06/19/2023       [id number] score   Sex: 0  Female (1 point)   Male (0 points)    Age: 2   ?64 years old (0 points)   65 to 74 years old (1 point)   ?75 years old (2 points)    Comorbidities: 1+1+0+0+1   Heart failure (1 point)   Hypertension (1 point)   Diabetes mellitus (1 point)   History of stroke, TIA, or thromboembolism (2 points)   Vascular disease (history of MI, PAD, or aortic  atherosclerosis) (1 point)    Total points: 5    Unadjusted stroke rate: [Gómez CUMMINS, Zoe GUZMAN, eLe EMERY. Evaluation of risk stratification schemes for ischaemic stroke and bleeding in [id number] patients with atrial fibrillation: the [place] Atrial Fibrillation cohort study. Eur Heart J 2012; 33:1500.]  0 points: 0.2% per year  1 point:  0.6% per year  2 points: 2.2% per year  3 points: 3.2% per year  4 points: 4.8% per year  5 points: 7.2% per year  6 points: 9.7% per year  7 points: 11.1% per year  8 points: 11% per year  9 points: 12.2% per year    HAS-BLED score:  Hypertension (1 point) : 1  Abnormal liver function (cirrhosis, bilirubin >2xULN or ALT>3xULN) (1 point): 0  Abnormal renal function (dialysis, renal transplant or Cr>2.26) (1 point): 0  Stroke (1 point): 0  Bleeding tendency or predisposition (1 point): 1  Labile INRs in patients taking warfarin (<60%TTR or high/labile INR) (1 point): 0  Elderly: age greater than 65 years (1 point): 1  Drugs: concomitant antiplatelet agents (eg, aspirin, clopidogrel, ticlopidine, nonsteroidal antiinflammatory agents) (1 point): 1  Drugs: concomitant excess alcohol use (1 point): 0    Total score: 4    0 points: 1.13 bleeds per 100 patient-years  1 point:  1.02 bleeds per 100 patient-years  2 points: 1.88 bleeds per 100 patient-years  3 points: 3.74 bleeds per 100 patient-years  4 points: 8.70 bleeds per 100 patient-years  5 to 9 points: Insufficient data    [Lip GY. Implications of the ANNEL(2)DS(2)-VASc and HAS-BLED Scores for thromboprophylaxis in atrial fibrillation. Am J Med 2011; 124:111.]    If you answer NO to any of the four criteria below, the patient does not meet the eligibility requirements for LAAC via Watchman implantation:    Patient has non-valvular atrial fibrillation: Yes  Patient has an increased risk for stroke and is recommended for oral anticoagulation (OAC): Yes  Patient is suitable for short-term anticoagulation therapy but deemed unable to take  long-term OAC: Yes  Patient has an appropriate rationale to seek a non-pharmacological alternative to OACs. Specific factors include: History of bleeding or increased bleeding risk             Assessment & Plan:     1. Permanent atrial fibrillation  CTA Cardiac    Creatinine, serum      2. At high risk for bleeding  CTA Cardiac    Creatinine, serum      3. Primary hypertension        4. Status post placement of cardiac pacemaker        5. Sinus node dysfunction                This is a 78 y.o. pleasant and robust 78-year-old  male. Their [id number] score is 5 and HAS-BLED score is 4.  He has hematuria related to rivaroxaban use     We discussed the rationale, risks, benefits, and alternatives for seeking left atrial appendage closure with the Watchman device.  Shared decision making using the ACC/HRS algorithm and shared decision-making tool was used to help guide the discussion.  The benefits of WHITNEY closure include risk reduction for ischemic stroke similar to that of chronic OAC without need for chronic OAC.  The risks include a <1% risk of death, tamponade, need for emergency heart surgery, device embolization, stroke, bleeding, vascular injury, and a 2-3% longterm risk of device related thrombus.  The patient is a candidate for short-term OAC but certainly not long-term as detailed above . We addressed the alternatives of oral anticoagulation or no OAC with the high risk of stroke.  I believe it reasonable to proceed with WHITNEY closure. They would like to proceed with left atrial appendage closure and shared decision making discussed with Dr. Love.  I gave him the Jena to do the thank          Cardiac CTA to evaluate WHITNEY anatomy and suitability for transcatheter closure  Plan to transition to DAPT immediately post LAAO   Continue sacubitril-valsartan and carvedilol for history of HFrEF  Continue PPM checks    Emphasized the importance of modifying lifestyle related risk factors including smoking  cessation, limiting alcohol intake, exercise, diet most resembling a Mediterranean diet.    Thank you, Dr. Love, for the opportunity to participate in Rodney Irene Jr. 's care today.    Please follow up with me in 3 months.      Nitin Peña MD, [facility]  Interventional Cardiology/Structural Heart Disease  [facility]  Office: [phone]     Parts of this note were completed using voice recognition software. Please excuse any misspellings or syntax errors and reach out to me with questions.

## 2024-05-29 ENCOUNTER — TELEPHONE (OUTPATIENT)
Dept: CARDIOLOGY | Facility: CLINIC | Age: 79
End: 2024-05-29
Payer: MEDICARE

## 2024-06-04 ENCOUNTER — TELEPHONE (OUTPATIENT)
Dept: CARDIOLOGY | Facility: CLINIC | Age: 79
End: 2024-06-04
Payer: MEDICARE

## 2024-06-04 DIAGNOSIS — I51.3 THROMBUS OF LEFT ATRIAL APPENDAGE: ICD-10-CM

## 2024-06-04 DIAGNOSIS — I48.21 PERMANENT ATRIAL FIBRILLATION: Primary | ICD-10-CM

## 2024-06-04 RX ORDER — WARFARIN SODIUM 5 MG/1
5 TABLET ORAL DAILY
Qty: 30 TABLET | Refills: 1 | Status: SHIPPED | OUTPATIENT
Start: 2024-06-04 | End: 2025-06-04

## 2024-06-04 NOTE — TELEPHONE ENCOUNTER
Spoke with spouse and informed her of patient stopping Xarelto and starting Warfarin. 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 Informed her that Dr. Love will perform a GENO in 6-8 weeks and we will revisit Watchman procedure after that. Verbalized understanding.

## 2024-06-05 ENCOUNTER — TELEPHONE (OUTPATIENT)
Dept: CARDIOLOGY | Facility: CLINIC | Age: 79
End: 2024-06-05
Payer: MEDICARE

## 2024-06-05 NOTE — TELEPHONE ENCOUNTER
Message left to follow Coumadin clinics instruction regarding dose of Coumadin and to call with any questions or concerns. Message also forwarded to Coumadin clinic staff.

## 2024-06-05 NOTE — TELEPHONE ENCOUNTER
----- Message from Marti Ugalde sent at 6/5/2024  8:24 AM CDT -----  Contact: self  Type:  Needs Medical Advice    Who Called:  Pt   Symptoms (please be specific):  Pt need direction on new medication warfarin (COUMADIN) 5 MG tablet  Pt states from what he was told, the bottle has a different instruction...   Best Call Back Number:  [phone] / Please call to advise... Thank you...

## 2024-06-17 ENCOUNTER — TELEPHONE (OUTPATIENT)
Dept: NEPHROLOGY | Facility: CLINIC | Age: 79
End: 2024-06-17
Payer: MEDICARE

## 2024-06-17 NOTE — TELEPHONE ENCOUNTER
----- Message from Marleni Salmon sent at 6/17/2024 12:49 PM CDT -----  Contact: self  Type: Needs Medical Advice  Who Called:  pt  Symptoms (please be specific):  calcium  Best Call Back Number: [phone]   Additional Information: pt took blood test and seems as though his calcium level is up.pt had the tests done for yashira steiner and would like you to look at them.please call

## 2024-06-18 NOTE — TELEPHONE ENCOUNTER
Ca level looks okay when adjusted for his albumin concentration. About the same as previous. His PTH is pending prior to next visit with me. Will discuss further management at next visit.

## 2024-06-21 PROBLEM — Z79.01 LONG TERM (CURRENT) USE OF ANTICOAGULANTS: Status: ACTIVE | Noted: 2024-06-21

## 2024-07-23 ENCOUNTER — TELEPHONE (OUTPATIENT)
Dept: CARDIOLOGY | Facility: CLINIC | Age: 79
End: 2024-07-23
Payer: MEDICARE

## 2024-07-23 DIAGNOSIS — I51.3 THROMBUS OF LEFT ATRIAL APPENDAGE: Primary | ICD-10-CM

## 2024-07-23 DIAGNOSIS — Z95.818 PRESENCE OF WATCHMAN LEFT ATRIAL APPENDAGE CLOSURE DEVICE: ICD-10-CM

## 2024-07-25 ENCOUNTER — TELEPHONE (OUTPATIENT)
Dept: CARDIOLOGY | Facility: CLINIC | Age: 79
End: 2024-07-25
Payer: MEDICARE

## 2024-07-25 NOTE — TELEPHONE ENCOUNTER
----- Message from Awilda Carver sent at 7/25/2024 12:33 PM CDT -----  Regarding: rt call  Type:  Patient Returning Call    Who Called:pt    Who Left Message for Patient:unknown    Does the patient know what this is regarding?:yes    Best Call Back Number:[phone]      Additional Information: pt st that he needs a call back today its concerning it sx that's schedule for tomorrow. Please call to discuss.

## 2024-07-25 NOTE — TELEPHONE ENCOUNTER
Spoke with patient and informed him that I reached out to Dr. Love's office and they will cancel GENO scheduled on 7/26/24. Informed him that cardiac CTA has been scheduled on 8/9/24 and to arrive at [facility] at 7:30 am on 8/9/24, fast 4 hours prior and no caffeine after midnight the night before. Patient verbalized understanding.

## 2024-07-26 ENCOUNTER — TELEPHONE (OUTPATIENT)
Dept: CARDIOLOGY | Facility: CLINIC | Age: 79
End: 2024-07-26
Payer: MEDICARE

## 2024-07-26 NOTE — TELEPHONE ENCOUNTER
[facility]  Cta cardiac   8/9/24  P: [phone]  F: [phone]  Pacemaker  Lead  Coumadin  Also was sent to coumadin clinic

## 2024-08-01 ENCOUNTER — TELEPHONE (OUTPATIENT)
Dept: CARDIOLOGY | Facility: CLINIC | Age: 79
End: 2024-08-01
Payer: MEDICARE

## 2024-08-01 NOTE — TELEPHONE ENCOUNTER
Spoke with patient and rescheduled appt to 8/27/24. Patient agreeable and verbalized understanding.

## 2024-08-12 ENCOUNTER — TELEPHONE (OUTPATIENT)
Dept: CARDIOLOGY | Facility: CLINIC | Age: 79
End: 2024-08-12
Payer: MEDICARE

## 2024-08-12 NOTE — TELEPHONE ENCOUNTER
Spoke with patient and informed him that Dr. Peña has reviewed CTA which still shows thrombus. Dr. Peña recommends Hematology consult and sent to Dr. Gómez and Dr. Love. Patient verbalized understanding.

## 2024-08-12 NOTE — TELEPHONE ENCOUNTER
----- Message from Kylie Alvarez sent at 8/12/2024 12:44 PM CDT -----  Type: Needs Medical Advice  Who Called:  Patient  Symptoms (please be specific):    How long has patient had these symptoms:    Pharmacy name and phone #:   Best Call Back Number: [phone]  Additional Information: Patient is requesting a call back from the nurse.

## 2024-08-13 ENCOUNTER — TELEPHONE (OUTPATIENT)
Dept: CARDIOLOGY | Facility: CLINIC | Age: 79
End: 2024-08-13
Payer: MEDICARE

## 2024-08-13 NOTE — TELEPHONE ENCOUNTER
----- Message from Anna Lujan sent at 8/13/2024  4:20 PM CDT -----  Contact: Patient  Type:  Needs Medical Advice    Who Called: Patient       Would the patient rather a call back or a response via [facility]? Call    Best Call Back Number: [phone] (home)     Additional Information: Patient is requesting a call regarding a consultation he was supposed to get. Please call to advise

## 2024-08-20 ENCOUNTER — TELEPHONE (OUTPATIENT)
Dept: CARDIOLOGY | Facility: CLINIC | Age: 79
End: 2024-08-20
Payer: MEDICARE

## 2024-08-20 NOTE — TELEPHONE ENCOUNTER
Spoke with patient appointment with Dr. Peña moved to 10/16/24 at 0900. Patient does have an appointment with Dr. Arceo on 8/26/24. And patient states he is currently on Eliquis 5 mg BID per Dr. Love.

## 2024-09-03 ENCOUNTER — OFFICE VISIT (OUTPATIENT)
Dept: NEPHROLOGY | Facility: CLINIC | Age: 79
End: 2024-09-03
Payer: MEDICARE

## 2024-09-03 VITALS
OXYGEN SATURATION: 94 % | BODY MASS INDEX: 28.65 KG/M2 | HEIGHT: 68 IN | DIASTOLIC BLOOD PRESSURE: 66 MMHG | HEART RATE: 70 BPM | SYSTOLIC BLOOD PRESSURE: 118 MMHG

## 2024-09-03 DIAGNOSIS — N18.31 STAGE 3A CHRONIC KIDNEY DISEASE: Primary | ICD-10-CM

## 2024-09-03 DIAGNOSIS — E21.3 HYPERPARATHYROIDISM: ICD-10-CM

## 2024-09-03 DIAGNOSIS — I48.0 PAROXYSMAL ATRIAL FIBRILLATION: ICD-10-CM

## 2024-09-03 DIAGNOSIS — I50.9 CHRONIC CONGESTIVE HEART FAILURE, UNSPECIFIED HEART FAILURE TYPE: ICD-10-CM

## 2024-09-03 DIAGNOSIS — E83.52 HYPERCALCEMIA: ICD-10-CM

## 2024-09-03 DIAGNOSIS — N40.0 BENIGN PROSTATIC HYPERPLASIA WITHOUT LOWER URINARY TRACT SYMPTOMS: ICD-10-CM

## 2024-09-03 DIAGNOSIS — I10 ESSENTIAL HYPERTENSION: ICD-10-CM

## 2024-09-03 DIAGNOSIS — M81.0 AGE-RELATED OSTEOPOROSIS WITHOUT CURRENT PATHOLOGICAL FRACTURE: ICD-10-CM

## 2024-09-03 PROCEDURE — 3078F DIAST BP <80 MM HG: CPT | Mod: CPTII,S$GLB,, | Performed by: STUDENT IN AN ORGANIZED HEALTH CARE EDUCATION/TRAINING PROGRAM

## 2024-09-03 PROCEDURE — 99214 OFFICE O/P EST MOD 30 MIN: CPT | Mod: S$GLB,,, | Performed by: STUDENT IN AN ORGANIZED HEALTH CARE EDUCATION/TRAINING PROGRAM

## 2024-09-03 PROCEDURE — 1159F MED LIST DOCD IN RCRD: CPT | Mod: CPTII,S$GLB,, | Performed by: STUDENT IN AN ORGANIZED HEALTH CARE EDUCATION/TRAINING PROGRAM

## 2024-09-03 PROCEDURE — 3074F SYST BP LT 130 MM HG: CPT | Mod: CPTII,S$GLB,, | Performed by: STUDENT IN AN ORGANIZED HEALTH CARE EDUCATION/TRAINING PROGRAM

## 2024-09-03 PROCEDURE — 99999 PR PBB SHADOW E&M-EST. PATIENT-LVL III: CPT | Mod: PBBFAC,,, | Performed by: STUDENT IN AN ORGANIZED HEALTH CARE EDUCATION/TRAINING PROGRAM

## 2024-09-03 RX ORDER — CINACALCET 30 MG/1
30 TABLET, FILM COATED ORAL
Qty: 12 TABLET | Refills: 11 | Status: SHIPPED | OUTPATIENT
Start: 2024-09-04 | End: 2025-09-04

## 2024-09-03 NOTE — PROGRESS NOTES
Subjective:       Patient ID: Rodney Irene Jr. is a 79 y.o. M who returns for ongoing evaluation and management of CKD.   He was previously followed by Dr. Aguila locally for chronic kidney disease evaluation and management.  He has a pertinent past medical history including paroxysmal atrial fibrillation s/p pacemaker, coronary artery disease, hyperparathyroidism of renal origin, BPH, hypertension, and osteopenia on alendronate therapy.  He is previously reported history of chronic kidney disease stage 3a.  His last serum creatinine was on 03/29/2024 and was 1.26 mg/dL, slightly up from baseline of 0.9-1.1.    In terms of his renal history, he denies hospitalizations for prior IZABEL or IZABEL requiring RRT. Reports a remote history of single nephrolith episode. No reported history of frequent or recurrent use of NSAIDs/COXI. No pertinent rheumatologic or AI disease history. Denies any pertinent family history of known kidney disease, or family members diagnosed with ESRD requiring dialysis.  Smoking Hx: quit tobacco in the 80s  Other pertinent urologic history: BPH - s/p TURP  Fluid intake: 2-3 bottles of water per day.    Interval history  9/3/24 - patient returns for ongoing follow up.  He is doing well today and voices no new medical complaints. He has no uremic or urinary symptoms and is in his usual state of health.    We reviewed his recent lab trends at chair side.  Last chemistry panel was from 08/27/2024 and featured a serum creatinine of 1.28 mg/dL which is notably consistent with his baseline values.  He is still pending for Watchman, as currently with a thrombus in his left atrial appendage.  He has been referred to Hematology for hypercoagulability workup.  Currently on apixaban.  We discussed his hypercalcemia and his hyperparathyroidism-both appear to be up trending since taking him off the cinacalcet.  We discussed the potential of doing a repeat nuclear medicine scan, however if he did have an  adenoma, he would unlikely be a surgical candidate with the need for anticoagulation at this given time.  We have opted to continue conservative care with restarting the cinacalcet at this time.  He is in agreement with the plan.          Review of Systems   Constitutional:  Negative for chills, diaphoresis and fever.   Respiratory:  Negative for cough and shortness of breath.    Cardiovascular:  Negative for chest pain and leg swelling.   Gastrointestinal:  Negative for abdominal pain, diarrhea, nausea and vomiting.   Genitourinary:  Negative for difficulty urinating, dysuria and hematuria.   Musculoskeletal:  Negative for myalgias.   Neurological:  Negative for headaches.   Hematological:  Does not bruise/bleed easily.   All other systems reviewed and are negative.      The past medical, family and social histories were reviewed for this encounter.     Past Medical History:   Diagnosis Date    A-fib     Acute Bronchitis 9/14/16     Anticoagulant long-term use     Anxiety     Atrial fibrillation     Coronary artery disease     minimal    Cramp of limb     Diastasis of muscle     Diseases of tricuspid valve     Disorder of kidney and ureter     Elevated prostate specific antigen (PSA)     GERD (gastroesophageal reflux disease)     Hypercalcemia     Hyperparathyroidism     Hyperpotassemia     Hypertension     Hypertrophy of prostate without urinary obstruction and other lower urinary tract symptoms (LUTS)     Irritable bowel syndrome     Mitral valve disorders(424.0)     Non-cardiac chest pain     Occlusion and stenosis of carotid artery without mention of cerebral infarction     Osteoarthrosis, unspecified whether generalized or localized, hand     Osteoporosis     Osteoporosis, unspecified     Other bursitis disorders     Other chronic nonalcoholic liver disease     Other chronic pulmonary heart diseases     Trouble in sleeping     Unspecified sleep apnea     don't use C-PAP    Ventral hernia, unspecified, without  "mention of obstruction or gangrene        Current Outpatient Medications   Medication Sig    apixaban (ELIQUIS) 5 mg Tab Take 5 mg by mouth 2 (two) times daily.    ascorbic acid, vitamin C, (VITAMIN C) 1000 MG tablet Take 1,000 mg by mouth once daily.    carvediloL (COREG) 6.25 MG tablet Take 12.5 mg by mouth 2 (two) times daily.    citalopram (CELEXA) 10 MG tablet Take 10 mg by mouth once daily.    dutasteride (AVODART) 0.5 mg capsule Take 0.5 mg by mouth every Mon, Wed, Fri.     ENTRESTO 49-51 mg per tablet Take 1 tablet by mouth 2 (two) times daily.    hydrALAZINE (APRESOLINE) 25 MG tablet Take 25 mg by mouth daily as needed.    levocetirizine (XYZAL) 5 MG tablet Take 1 tablet (5 mg total) by mouth every evening.    magnesium hydroxide (MAGNESIA ORAL) Take 400 mg by mouth nightly.    multivitamin (THERAGRAN) per tablet Take 1 tablet by mouth 2 (two) times a day.    omeprazole (PRILOSEC) 20 MG capsule Take 20 mg by mouth every Mon, Wed, Fri.     rosuvastatin (CRESTOR) 20 MG tablet Take 1 tablet (20 mg total) by mouth every evening.    [START ON 9/4/2024] cinacalcet (SENSIPAR) 30 MG Tab Take 1 tablet (30 mg total) by mouth every Mon, Wed, Fri. Take with breakfast    enoxaparin (LOVENOX) 100 mg/mL Syrg INJECT 90MG (0.9ML) INTO ABDOMEN EVERY 12 HOURS AS INSTRUCTED BY THE COUMADIN CLINIC. PLEASE CALL THE COUMADIN CLINIC [phone] WITH ANY QUESTIONS (Patient not taking: Reported on 9/3/2024)    JANTOVEN 5 mg tablet Take 1-1.5 Tablets QPM as instructed by coumadin clinic  * *NORM WARFARIN FAILURE* *BRAND MEDICALLY NECESSARY* * (Patient not taking: Reported on 9/3/2024)    levothyroxine (SYNTHROID) 25 MCG tablet Take 25 mcg by mouth. (Patient not taking: Reported on 9/3/2024)     No current facility-administered medications for this visit.     /66 (BP Location: Right arm, Patient Position: Sitting, BP Method: Medium (Manual))   Pulse 70   Ht 5' 8" (1.727 m)   SpO2 (!) 94%   BMI 28.65 kg/m²     Objective:    "   Physical Exam  Vitals reviewed.   Constitutional:       General: He is not in acute distress.     Appearance: Normal appearance.   Cardiovascular:      Rate and Rhythm: Normal rate and regular rhythm.      Pulses: Normal pulses.      Heart sounds: Normal heart sounds.      No friction rub.   Pulmonary:      Effort: Pulmonary effort is normal. No respiratory distress.      Breath sounds: Normal breath sounds.   Abdominal:      General: Abdomen is protuberant.      Palpations: Abdomen is soft.   Musculoskeletal:         General: No swelling.      Cervical back: Normal range of motion. No tenderness.      Right lower leg: No edema.      Left lower leg: No edema.   Neurological:      General: No focal deficit present.      Mental Status: He is oriented to person, place, and time.      Motor: No weakness.   Psychiatric:         Mood and Affect: Mood normal.         Behavior: Behavior normal.         Assessment:     Lab Results   Component Value Date    CREATININE 1.28 08/27/2024    BUN 26 (H) 08/27/2024     08/27/2024    K 5.0 08/27/2024     08/27/2024    CO2 23 08/27/2024     Lab Results   Component Value Date    .1 (H) 08/27/2024    CALCIUM 10.9 (H) 08/27/2024    CALCIUM 10.8 (H) 08/27/2024    PHOS 2.7 08/27/2024     Lab Results   Component Value Date    HCT 48.0 08/27/2024     Prot/Creat Ratio, Urine   Date Value Ref Range Status   08/27/2024 130.2 (H) 15.0 - 68.0 mg/g Final   07/06/2023 134.2 (H) 15.0 - 68.0 mg/g Final   07/05/2022 83.4 (H) 15.0 - 68.0 mg/g Final       Lab Results   Component Value Date    MICALBCREAT 70.1 (H) 08/27/2024    MICALBCREAT 39.1 (H) 07/06/2023    MICALBCREAT 20.6 07/05/2022    MICALBCREAT 109.2 (H) 12/29/2019           1. Stage 3a chronic kidney disease    2. Hyperparathyroidism    3. Paroxysmal atrial fibrillation    4. Age-related osteoporosis without current pathological fracture    5. Chronic congestive heart failure, unspecified heart failure type    6. Essential  hypertension    7. Benign prostatic hyperplasia without lower urinary tract symptoms    8. Hypercalcemia          Plan:   Return to clinic in 6 months.  Labs for next visit include PTH, RFP, UACR, UPCR, Vit D, .  Baseline creatinine is 0.9-1.1.    CKD stage G2 A2  Risk: hypertension, CV/hemodynamics, age  -on RASI for CKD-MACE risk reduction, proteinuria reduction and casa-protection  -avoiding dehydration or decompensated CHF with maintenance of consistent fluid intake  -avoiding extra salt in the diet  -renal fxn at relative baseline; low risk of progression via TANGRI model    Paroxysmal atrial fibrillation s/p pacer. On apixaban. Going for watchman evaluation soon.    BPH s/p TURP - denies symptoms today. Recent CT abd without evidence of nephrolithiasis    Hypertension - holding prn hydralazine at this time. On low dose entresto. BP reviewed; asymptomatic at this time.    CHF - appears compensated at this time. On entresto    Atrial Clot - now seeing hematology for this.    Hyperparathyroidism / osteoporosis - currently on alendronate therapy by your PCP. Difficult to interpret PTH and Ca values while on this therapy. PTH does appear to be uptrending- will restart cinacalcet 30mcg as per previous dosage. He had a NM scan in 2020 which was without adenoma.  Not a good surgical candidate at the moment given need for anticoagulation.    Enlarged paratracheal lymphnode - noted per CTA Cardiac from 8/9/24. Is seeing Dr. Helm at [facility].     Joe Valencia MD  [facility]    KFRE 2-Year: 0.2% at 8/27/2024  1:08 PM  Calculated from:  Serum Creatinine: 1.28 mg/dL at 8/27/2024  1:01 PM  Urine Albumin Creatinine Ratio: 70.1 ug/mg at 8/27/2024  1:08 PM  Age: 79 years  Sex: Male at 8/27/2024  1:08 PM  Has CKD-3 to CKD-5: No    KFRE 5-Year: 0.5% at 8/27/2024  1:08 PM  Calculated from:  Serum Creatinine: 1.28 mg/dL at 8/27/2024  1:01 PM  Urine Albumin Creatinine Ratio: 70.1 ug/mg at 8/27/2024  1:08  PM  Age: 79 years  Sex: Male at 8/27/2024  1:08 PM  Has CKD-3 to CKD-5: No

## 2024-09-29 PROBLEM — Z91.89 AT HIGH RISK FOR BLEEDING: Chronic | Status: ACTIVE | Noted: 2024-05-24

## 2024-09-29 PROBLEM — Z71.89 ACP (ADVANCE CARE PLANNING): Chronic | Status: ACTIVE | Noted: 2021-04-26

## 2024-09-29 PROBLEM — I50.9 CHRONIC CONGESTIVE HEART FAILURE: Chronic | Status: ACTIVE | Noted: 2024-03-04

## 2024-09-29 PROBLEM — Z95.0 STATUS POST PLACEMENT OF CARDIAC PACEMAKER: Chronic | Status: ACTIVE | Noted: 2024-04-03

## 2024-09-29 PROBLEM — I25.118 ATHEROSCLEROTIC HEART DISEASE OF NATIVE CORONARY ARTERY WITH OTHER FORMS OF ANGINA PECTORIS: Chronic | Status: ACTIVE | Noted: 2017-11-22

## 2024-09-29 PROBLEM — Z79.01 CURRENT USE OF LONG TERM ANTICOAGULATION: Chronic | Status: ACTIVE | Noted: 2021-03-02

## 2024-09-29 PROBLEM — I48.21 PERMANENT ATRIAL FIBRILLATION: Chronic | Status: ACTIVE | Noted: 2024-05-24

## 2024-10-11 PROBLEM — R59.0 MEDIASTINAL ADENOPATHY: Status: ACTIVE | Noted: 2024-10-11

## 2024-10-14 ENCOUNTER — TELEPHONE (OUTPATIENT)
Dept: CARDIOLOGY | Facility: CLINIC | Age: 79
End: 2024-10-14
Payer: MEDICARE

## 2024-10-14 NOTE — TELEPHONE ENCOUNTER
Please advise: pt has appt with you on Wednesday: he still has blood clot and is asking if you still want to see him since Watchman cannot be scheduled yet

## 2024-10-15 DIAGNOSIS — I48.21 PERMANENT ATRIAL FIBRILLATION: Primary | ICD-10-CM

## 2024-10-15 DIAGNOSIS — I51.3 THROMBUS OF LEFT ATRIAL APPENDAGE: ICD-10-CM

## 2024-11-25 ENCOUNTER — TELEPHONE (OUTPATIENT)
Dept: CARDIOLOGY | Facility: CLINIC | Age: 79
End: 2024-11-25
Payer: MEDICARE

## 2024-11-25 NOTE — TELEPHONE ENCOUNTER
----- Message from Irene sent at 11/25/2024  9:07 AM CST -----  Contact: self  Type:  Needs Medical Advice    Who Called: self  Symptoms (please be specific): pt is needing to speak to nurse or dr about his procedure for tomorrow, he sts it is double scheduled. Please call pt to discuss.      Would the patient rather a call back or a response via [facility]? call  Best Call Back Number: [phone] (home)     Additional Information: please advise and thank you.

## 2024-12-04 ENCOUNTER — TELEPHONE (OUTPATIENT)
Dept: CARDIOLOGY | Facility: CLINIC | Age: 79
End: 2024-12-04

## 2024-12-04 NOTE — TELEPHONE ENCOUNTER
----- Message from Trang sent at 12/4/2024  1:51 PM CST -----  Regarding: Test Results  Type:  Test Results    Who Called: pt    Name of Test (Lab/Mammo/Etc):     Date of Test: 11/26    Ordering Provider:     Where the test was performed:     Would the patient rather a call back or a response via [facility]? Call back    Best Call Back Number: [phone]      Additional Information:  Pt is requesting a call back to go over tests taken last week.   Please advise. Thank you!

## 2024-12-09 ENCOUNTER — TELEPHONE (OUTPATIENT)
Dept: CARDIOLOGY | Facility: CLINIC | Age: 79
End: 2024-12-09
Payer: MEDICARE

## 2024-12-09 NOTE — TELEPHONE ENCOUNTER
Spoke with patient and discussed Watchman date of 1/10/2025. Patient agreeable and verbalized understanding.

## 2024-12-18 ENCOUNTER — TELEPHONE (OUTPATIENT)
Dept: CARDIOLOGY | Facility: CLINIC | Age: 79
End: 2024-12-18
Payer: MEDICARE

## 2024-12-18 NOTE — TELEPHONE ENCOUNTER
Spoke with patient and pre op date given and confirmed Watchman date of 1/10/25. Patient verbalized understanding.

## 2024-12-20 ENCOUNTER — TELEPHONE (OUTPATIENT)
Dept: CARDIOLOGY | Facility: CLINIC | Age: 79
End: 2024-12-20
Payer: MEDICARE

## 2024-12-20 NOTE — TELEPHONE ENCOUNTER
WHITNEY thrombus has resolved. We can schedule LAAO with a Lovenox bridge for 2 days) to avoid any interruption in anticoagulation.

## 2024-12-31 NOTE — TELEPHONE ENCOUNTER
----- Message from Chanda Pringle NP sent at 12/20/2024  3:23 PM CST -----  Lovenox bridge x 2 days prior LAAO

## 2025-01-03 PROBLEM — G47.33 OSA (OBSTRUCTIVE SLEEP APNEA): Status: ACTIVE | Noted: 2025-01-03

## 2025-01-03 PROBLEM — I63.9 CEREBROVASCULAR ACCIDENT (CVA): Status: ACTIVE | Noted: 2025-01-03

## 2025-01-03 PROBLEM — I38 VALVULAR HEART DISEASE: Status: ACTIVE | Noted: 2025-01-03

## 2025-01-03 PROBLEM — N18.31 CKD STAGE 3A, GFR 45-59 ML/MIN: Status: ACTIVE | Noted: 2025-01-03

## 2025-01-10 DIAGNOSIS — I48.0 PAROXYSMAL ATRIAL FIBRILLATION: ICD-10-CM

## 2025-01-10 DIAGNOSIS — I48.0 PAF (PAROXYSMAL ATRIAL FIBRILLATION): Primary | ICD-10-CM

## 2025-01-10 DIAGNOSIS — Z95.818 PRESENCE OF WATCHMAN LEFT ATRIAL APPENDAGE CLOSURE DEVICE: ICD-10-CM

## 2025-02-17 ENCOUNTER — TELEPHONE (OUTPATIENT)
Dept: CARDIOLOGY | Facility: CLINIC | Age: 80
End: 2025-02-17
Payer: MEDICARE

## 2025-02-17 NOTE — TELEPHONE ENCOUNTER
Spoke with patient and rescheduled appointment and informed him of GENO scheduled on 2/21/25 and to arrive at [facility] at 0900. Patient verbalized understanding.

## 2025-03-03 ENCOUNTER — OFFICE VISIT (OUTPATIENT)
Dept: NEPHROLOGY | Facility: CLINIC | Age: 80
End: 2025-03-03
Payer: MEDICARE

## 2025-03-03 VITALS
HEIGHT: 67 IN | HEART RATE: 69 BPM | SYSTOLIC BLOOD PRESSURE: 120 MMHG | BODY MASS INDEX: 28.23 KG/M2 | WEIGHT: 179.88 LBS | OXYGEN SATURATION: 99 % | DIASTOLIC BLOOD PRESSURE: 78 MMHG

## 2025-03-03 DIAGNOSIS — I10 ESSENTIAL HYPERTENSION: ICD-10-CM

## 2025-03-03 DIAGNOSIS — N18.31 STAGE 3A CHRONIC KIDNEY DISEASE: Primary | ICD-10-CM

## 2025-03-03 DIAGNOSIS — N40.0 BENIGN PROSTATIC HYPERPLASIA WITHOUT LOWER URINARY TRACT SYMPTOMS: ICD-10-CM

## 2025-03-03 DIAGNOSIS — I48.0 PAROXYSMAL ATRIAL FIBRILLATION: ICD-10-CM

## 2025-03-03 DIAGNOSIS — E83.52 HYPERCALCEMIA: ICD-10-CM

## 2025-03-03 DIAGNOSIS — E21.3 HYPERPARATHYROIDISM: ICD-10-CM

## 2025-03-03 DIAGNOSIS — I50.9 CHRONIC CONGESTIVE HEART FAILURE, UNSPECIFIED HEART FAILURE TYPE: ICD-10-CM

## 2025-03-03 PROCEDURE — 3078F DIAST BP <80 MM HG: CPT | Mod: CPTII,S$GLB,, | Performed by: STUDENT IN AN ORGANIZED HEALTH CARE EDUCATION/TRAINING PROGRAM

## 2025-03-03 PROCEDURE — 3074F SYST BP LT 130 MM HG: CPT | Mod: CPTII,S$GLB,, | Performed by: STUDENT IN AN ORGANIZED HEALTH CARE EDUCATION/TRAINING PROGRAM

## 2025-03-03 PROCEDURE — 99214 OFFICE O/P EST MOD 30 MIN: CPT | Mod: S$GLB,,, | Performed by: STUDENT IN AN ORGANIZED HEALTH CARE EDUCATION/TRAINING PROGRAM

## 2025-03-03 PROCEDURE — 99999 PR PBB SHADOW E&M-EST. PATIENT-LVL III: CPT | Mod: PBBFAC,,, | Performed by: STUDENT IN AN ORGANIZED HEALTH CARE EDUCATION/TRAINING PROGRAM

## 2025-03-03 PROCEDURE — 1159F MED LIST DOCD IN RCRD: CPT | Mod: CPTII,S$GLB,, | Performed by: STUDENT IN AN ORGANIZED HEALTH CARE EDUCATION/TRAINING PROGRAM

## 2025-03-03 RX ORDER — SACUBITRIL AND VALSARTAN 24; 26 MG/1; MG/1
1 TABLET, FILM COATED ORAL 2 TIMES DAILY
Qty: 60 TABLET | Refills: 11 | Status: SHIPPED | OUTPATIENT
Start: 2025-03-03 | End: 2026-02-26

## 2025-03-03 NOTE — PROGRESS NOTES
[facility]  Nephrology Clinic    Subjective:       HPI ID: Rodney Irene Jr. is a 79 y.o. M who returns for ongoing evaluation and management of CKD.   He was previously followed by Dr. Aguila locally for chronic kidney disease evaluation and management.  He has a pertinent past medical history including paroxysmal atrial fibrillation s/p pacemaker, coronary artery disease, hyperparathyroidism of renal origin, BPH, hypertension, and osteopenia on alendronate therapy.  He is previously reported history of chronic kidney disease stage 3a.  His last serum creatinine was on 03/29/2024 and was 1.26 mg/dL, slightly up from baseline of 0.9-1.1.    In terms of his renal history, he denies hospitalizations for prior IZABEL or IZABEL requiring RRT. Reports a remote history of single nephrolith episode. No reported history of frequent or recurrent use of NSAIDs/COXI. No pertinent rheumatologic or AI disease history. Denies any pertinent family history of known kidney disease, or family members diagnosed with ESRD requiring dialysis.  Smoking Hx: quit tobacco in the 80s  Other pertinent urologic history: BPH - s/p TURP  Fluid intake: 2-3 bottles of water per day.    Interval history  9/3/24 - patient returns for ongoing follow up.  He is doing well today and voices no new medical complaints. He has no uremic or urinary symptoms and is in his usual state of health.    We reviewed his recent lab trends at chair side.  Last chemistry panel was from 08/27/2024 and featured a serum creatinine of 1.28 mg/dL which is notably consistent with his baseline values.  He is still pending for Watchman, as currently with a thrombus in his left atrial appendage.  He has been referred to Hematology for hypercoagulability workup.  Currently on apixaban.  We discussed his hypercalcemia and his hyperparathyroidism-both appear to be up trending since taking him off the cinacalcet.  We discussed the potential of doing a  repeat nuclear medicine scan, however if he did have an adenoma, he would unlikely be a surgical candidate with the need for anticoagulation at this given time.  We have opted to continue conservative care with restarting the cinacalcet at this time.  He is in agreement with the plan.    03/03/25 - here today for CKD f/u. He is s/p watchman procedure last month. Denies acute complaints at this time. Feels well. He is avoiding dehydration. His appetite he reports is great.  We reviewed recent labs at chairside. Renal function based on serum creatinine trend remains at baseline.        Review of Systems   Constitutional:  Negative for chills, diaphoresis and fever.   Respiratory:  Negative for cough and shortness of breath.    Cardiovascular:  Negative for chest pain and leg swelling.   Gastrointestinal:  Negative for abdominal pain, diarrhea, nausea and vomiting.   Genitourinary:  Negative for difficulty urinating, dysuria and hematuria.   Musculoskeletal:  Negative for myalgias.   Neurological:  Negative for headaches.   Hematological:  Does not bruise/bleed easily.   All other systems reviewed and are negative.      The past medical, family and social histories were reviewed for this encounter.     Past Medical History:   Diagnosis Date    A-fib     Acute Bronchitis 9/14/16     Anticoagulant long-term use     Anxiety     Atrial fibrillation     Coronary artery disease     minimal    Cramp of limb     Diastasis of muscle     Diseases of tricuspid valve     Disorder of kidney and ureter     Elevated prostate specific antigen (PSA)     GERD (gastroesophageal reflux disease)     Hypercalcemia     Hyperparathyroidism     Hyperpotassemia     Hypertension     Hypertrophy of prostate without urinary obstruction and other lower urinary tract symptoms (LUTS)     Irritable bowel syndrome     Mitral valve disorders(424.0)     Non-cardiac chest pain     Occlusion and stenosis of carotid artery without mention of cerebral  infarction     Osteoarthrosis, unspecified whether generalized or localized, hand     Osteoporosis     Osteoporosis, unspecified     Other bursitis disorders     Other chronic nonalcoholic liver disease     Other chronic pulmonary heart diseases     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 Rescue    amLODIPine (NORVASC) 5 MG tablet Take 5 mg by mouth daily as needed.    ascorbic acid, vitamin C, (VITAMIN C) 1000 MG tablet Take 1,000 mg by mouth once daily.    aspirin 81 MG Chew Take 1 tablet (81 mg total) by mouth once daily.    benzonatate (TESSALON) 200 MG capsule Take 1 capsule (200 mg total) by mouth 3 (three) times daily as needed for Cough.    carvediloL (COREG) 6.25 MG tablet Take 25 mg by mouth 2 (two) times daily.    cinacalcet (SENSIPAR) 30 MG Tab Take 1 tablet (30 mg total) by mouth every Mon, Wed, Fri. Take with breakfast    citalopram (CELEXA) 10 MG tablet Take 10 mg by mouth once daily.    clopidogreL (PLAVIX) 75 mg tablet Take 1 tablet (75 mg total) by mouth once daily.    doxycycline (VIBRA-TABS) 100 MG tablet Take 1 tablet by mouth 60 minutes prior to any dental work including cleaning    dutasteride (AVODART) 0.5 mg capsule Take 0.5 mg by mouth every Mon, Wed, Fri.     fluticasone-umeclidin-vilanter (TRELEGY ELLIPTA) 100-62.5-25 mcg DsDv Inhale 1 puff into the lungs once daily.    hydrALAZINE (APRESOLINE) 25 MG tablet Take 25 mg by mouth daily as needed (If BP > 140/80 , take addtional every 1-2 hours max of 8 tabs per day).    levothyroxine (SYNTHROID) 25 MCG tablet Take 25 mcg by mouth every evening.    magnesium  "hydroxide (MAGNESIA ORAL) Take 400 mg by mouth nightly.    multivitamin (THERAGRAN) per tablet Take 1 tablet by mouth 2 (two) times a day.    omeprazole (PRILOSEC) 20 MG capsule Take 20 mg by mouth every Mon, Wed, Fri.     rosuvastatin (CRESTOR) 20 MG tablet TAKE 1 TABLET (20 MG TOTAL) BY MOUTH EVERY EVENING.    sacubitriL-valsartan (ENTRESTO) 24-26 mg per tablet Take 1 tablet by mouth 2 (two) times daily.     No current facility-administered medications for this visit.     /78 (BP Location: Left arm, Patient Position: Sitting)   Pulse 69   Ht 5' 7" (1.702 m)   Wt 81.6 kg (179 lb 14.3 oz)   SpO2 99%   BMI 28.18 kg/m²     Objective:      Physical Exam  Vitals reviewed.   Constitutional:       General: He is not in acute distress.     Appearance: Normal appearance.   Cardiovascular:      Pulses: Normal pulses.      Heart sounds: Normal heart sounds.      No friction rub.   Pulmonary:      Effort: Pulmonary effort is normal. No respiratory distress.      Breath sounds: Normal breath sounds.   Abdominal:      General: Abdomen is protuberant.      Palpations: Abdomen is soft.   Musculoskeletal:         General: No swelling.      Right lower leg: No edema.      Left lower leg: No edema.   Neurological:      General: No focal deficit present.      Mental Status: He is oriented to person, place, and time.      Motor: No weakness.   Psychiatric:         Mood and Affect: Mood normal.         Behavior: Behavior normal.         Assessment:     Lab Results   Component Value Date    CREATININE 1.24 02/27/2025    BUN 23 02/27/2025     02/27/2025    K 4.6 02/27/2025     02/27/2025    CO2 26 02/27/2025     Lab Results   Component Value Date    .2 (H) 02/27/2025    CALCIUM 10.4 02/27/2025    PHOS 3.4 02/27/2025     Lab Results   Component Value Date    HCT 42.8 01/03/2025     Prot/Creat Ratio, Urine   Date Value Ref Range Status   08/27/2024 130.2 (H) 15.0 - 68.0 mg/g Final   07/06/2023 134.2 (H) 15.0 - " 68.0 mg/g Final   07/05/2022 83.4 (H) 15.0 - 68.0 mg/g Final       Lab Results   Component Value Date    MICALBCREAT 271.2 (H) 02/27/2025    MICALBCREAT 70.1 (H) 08/27/2024    MICALBCREAT 39.1 (H) 07/06/2023    MICALBCREAT 20.6 07/05/2022    MICALBCREAT 109.2 (H) 12/29/2019           1. Stage 3a chronic kidney disease    2. Chronic congestive heart failure, unspecified heart failure type    3. Essential hypertension    4. Hypercalcemia    5. Hyperparathyroidism    6. Benign prostatic hyperplasia without lower urinary tract symptoms    7. Paroxysmal atrial fibrillation            Plan:   Return to clinic in 6 months.  Labs for next visit include PTH, RFP, UACR, UPCR, Vit D, .  Baseline creatinine is 0.9-1.1.    CKD stage G3 / A2  Risk: hypertension, CV/hemodynamics, age  -on RASI for CKD-MACE risk reduction, proteinuria reduction and casa-protection  -avoiding dehydration or decompensated CHF with maintenance of consistent fluid intake  -avoiding extra salt in the diet  -renal fxn at relative baseline; low risk of progression via TANGRI model    Paroxysmal atrial fibrillation s/p pacer. On apixaban. S/p watchman.    BPH s/p TURP - denies symptoms today.     Hypertension - holding prn hydralazine and amlodipine at this time. On low dose entresto and carvedilol. BP reviewed; asymptomatic at this time.    CHF - appears compensated at this time. On entresto    Hyperparathyroidism / osteoporosis - currently on alendronate therapy by PCP. Difficult to interpret PTH and Ca values while on this therapy. Iimproving pth trend with sensipar    Enlarged paratracheal lymphnode - noted per [facility] from 8/9/24. Is seeing Dr. Helm at [facility].     __________________________  Joe Valencia MD  [facility]    Part of this note has been created using Sien dictation system. Errors in transcription may not be completely avoided.      KFRE 2-Year: 0.2% at 2/27/2025  9:30 AM  Calculated from:  Serum Creatinine:  1.24 mg/dL at 2/27/2025  8:50 AM  Urine Albumin Creatinine Ratio: 271.2 ug/mg at 2/27/2025  9:30 AM  Age: 79 years  Sex: Male at 2/27/2025  9:30 AM  Has CKD-3 to CKD-5: Yes    KFRE 5-Year: 0.7% at 2/27/2025  9:30 AM  Calculated from:  Serum Creatinine: 1.24 mg/dL at 2/27/2025  8:50 AM  Urine Albumin Creatinine Ratio: 271.2 ug/mg at 2/27/2025  9:30 AM  Age: 79 years  Sex: Male at 2/27/2025  9:30 AM  Has CKD-3 to CKD-5: Yes

## 2025-03-12 PROBLEM — I51.3 THROMBUS OF LEFT ATRIAL APPENDAGE: Status: ACTIVE | Noted: 2025-03-12

## 2025-07-10 ENCOUNTER — TELEPHONE (OUTPATIENT)
Dept: CARDIOLOGY | Facility: CLINIC | Age: 80
End: 2025-07-10
Payer: MEDICARE

## 2025-07-11 ENCOUNTER — TELEPHONE (OUTPATIENT)
Dept: NEPHROLOGY | Facility: CLINIC | Age: 80
End: 2025-07-11
Payer: MEDICARE

## 2025-07-11 NOTE — TELEPHONE ENCOUNTER
Attempted to call patient to reschedule appointment. Unable to reach them at this time. Left voicemail. Sent message via vzaar.

## 2025-08-04 ENCOUNTER — TELEPHONE (OUTPATIENT)
Dept: CARDIOLOGY | Facility: CLINIC | Age: 80
End: 2025-08-04
Payer: MEDICARE

## 2025-08-04 NOTE — TELEPHONE ENCOUNTER
Contacted Dr. Love's office regarding 6 month post Watchman GENO needing to be scheduled. Spoke with Negra.